# Patient Record
Sex: FEMALE | Race: WHITE | Employment: OTHER | URBAN - METROPOLITAN AREA
[De-identification: names, ages, dates, MRNs, and addresses within clinical notes are randomized per-mention and may not be internally consistent; named-entity substitution may affect disease eponyms.]

---

## 2018-08-13 ENCOUNTER — APPOINTMENT (OUTPATIENT)
Dept: GENERAL RADIOLOGY | Age: 78
DRG: 470 | End: 2018-08-13
Payer: MEDICARE

## 2018-08-13 ENCOUNTER — HOSPITAL ENCOUNTER (INPATIENT)
Age: 78
LOS: 8 days | Discharge: NURSING, REHAB OR LONG-TERM CARE | DRG: 470 | End: 2018-08-21
Attending: EMERGENCY MEDICINE | Admitting: INTERNAL MEDICINE
Payer: MEDICARE

## 2018-08-13 DIAGNOSIS — S72.002A CLOSED FRACTURE OF LEFT HIP, INITIAL ENCOUNTER (HCC): Primary | ICD-10-CM

## 2018-08-13 PROBLEM — M25.552 HIP PAIN, ACUTE, LEFT: Status: ACTIVE | Noted: 2018-08-13

## 2018-08-13 LAB
ABO/RH: NORMAL
ALBUMIN SERPL-MCNC: 4.4 G/DL (ref 3.4–5)
ANION GAP SERPL CALCULATED.3IONS-SCNC: 10 MMOL/L (ref 3–16)
ANTIBODY SCREEN: NORMAL
APTT: 30 SEC (ref 26–36)
BASOPHILS ABSOLUTE: 0.1 K/UL (ref 0–0.2)
BASOPHILS RELATIVE PERCENT: 0.9 %
BUN BLDV-MCNC: 15 MG/DL (ref 7–20)
CALCIUM SERPL-MCNC: 9.5 MG/DL (ref 8.3–10.6)
CHLORIDE BLD-SCNC: 95 MMOL/L (ref 99–110)
CO2: 29 MMOL/L (ref 21–32)
CREAT SERPL-MCNC: 0.7 MG/DL (ref 0.6–1.2)
EKG ATRIAL RATE: 89 BPM
EKG DIAGNOSIS: NORMAL
EKG P AXIS: 66 DEGREES
EKG P-R INTERVAL: 194 MS
EKG Q-T INTERVAL: 348 MS
EKG QRS DURATION: 80 MS
EKG QTC CALCULATION (BAZETT): 423 MS
EKG R AXIS: -17 DEGREES
EKG T AXIS: 67 DEGREES
EKG VENTRICULAR RATE: 89 BPM
EOSINOPHILS ABSOLUTE: 0.4 K/UL (ref 0–0.6)
EOSINOPHILS RELATIVE PERCENT: 6.4 %
GFR AFRICAN AMERICAN: >60
GFR NON-AFRICAN AMERICAN: >60
GLUCOSE BLD-MCNC: 112 MG/DL (ref 70–99)
HCT VFR BLD CALC: 31.5 % (ref 36–48)
HEMOGLOBIN: 10.6 G/DL (ref 12–16)
INR BLD: 0.96 (ref 0.86–1.14)
LYMPHOCYTES ABSOLUTE: 1 K/UL (ref 1–5.1)
LYMPHOCYTES RELATIVE PERCENT: 16.2 %
MCH RBC QN AUTO: 31.1 PG (ref 26–34)
MCHC RBC AUTO-ENTMCNC: 33.6 G/DL (ref 31–36)
MCV RBC AUTO: 92.6 FL (ref 80–100)
MONOCYTES ABSOLUTE: 0.5 K/UL (ref 0–1.3)
MONOCYTES RELATIVE PERCENT: 7.2 %
NEUTROPHILS ABSOLUTE: 4.4 K/UL (ref 1.7–7.7)
NEUTROPHILS RELATIVE PERCENT: 69.3 %
PDW BLD-RTO: 13.2 % (ref 12.4–15.4)
PHOSPHORUS: 3.2 MG/DL (ref 2.5–4.9)
PLATELET # BLD: 242 K/UL (ref 135–450)
PMV BLD AUTO: 7 FL (ref 5–10.5)
POTASSIUM SERPL-SCNC: 4.3 MMOL/L (ref 3.5–5.1)
PROTHROMBIN TIME: 11 SEC (ref 9.8–13)
RBC # BLD: 3.41 M/UL (ref 4–5.2)
SODIUM BLD-SCNC: 134 MMOL/L (ref 136–145)
WBC # BLD: 6.4 K/UL (ref 4–11)

## 2018-08-13 PROCEDURE — 85730 THROMBOPLASTIN TIME PARTIAL: CPT

## 2018-08-13 PROCEDURE — 6360000002 HC RX W HCPCS

## 2018-08-13 PROCEDURE — 2580000003 HC RX 258: Performed by: INTERNAL MEDICINE

## 2018-08-13 PROCEDURE — 86850 RBC ANTIBODY SCREEN: CPT

## 2018-08-13 PROCEDURE — 73552 X-RAY EXAM OF FEMUR 2/>: CPT

## 2018-08-13 PROCEDURE — 85610 PROTHROMBIN TIME: CPT

## 2018-08-13 PROCEDURE — 96375 TX/PRO/DX INJ NEW DRUG ADDON: CPT

## 2018-08-13 PROCEDURE — 96376 TX/PRO/DX INJ SAME DRUG ADON: CPT

## 2018-08-13 PROCEDURE — 72170 X-RAY EXAM OF PELVIS: CPT

## 2018-08-13 PROCEDURE — 6370000000 HC RX 637 (ALT 250 FOR IP): Performed by: INTERNAL MEDICINE

## 2018-08-13 PROCEDURE — 85025 COMPLETE CBC W/AUTO DIFF WBC: CPT

## 2018-08-13 PROCEDURE — 6360000002 HC RX W HCPCS: Performed by: INTERNAL MEDICINE

## 2018-08-13 PROCEDURE — 73562 X-RAY EXAM OF KNEE 3: CPT

## 2018-08-13 PROCEDURE — 2500000003 HC RX 250 WO HCPCS

## 2018-08-13 PROCEDURE — 86901 BLOOD TYPING SEROLOGIC RH(D): CPT

## 2018-08-13 PROCEDURE — 1200000000 HC SEMI PRIVATE

## 2018-08-13 PROCEDURE — 96374 THER/PROPH/DIAG INJ IV PUSH: CPT

## 2018-08-13 PROCEDURE — 51702 INSERT TEMP BLADDER CATH: CPT

## 2018-08-13 PROCEDURE — 6360000002 HC RX W HCPCS: Performed by: EMERGENCY MEDICINE

## 2018-08-13 PROCEDURE — 86900 BLOOD TYPING SEROLOGIC ABO: CPT

## 2018-08-13 PROCEDURE — 93005 ELECTROCARDIOGRAM TRACING: CPT | Performed by: EMERGENCY MEDICINE

## 2018-08-13 PROCEDURE — 99285 EMERGENCY DEPT VISIT HI MDM: CPT

## 2018-08-13 PROCEDURE — 80069 RENAL FUNCTION PANEL: CPT

## 2018-08-13 PROCEDURE — 71045 X-RAY EXAM CHEST 1 VIEW: CPT

## 2018-08-13 RX ORDER — LISINOPRIL 20 MG/1
20 TABLET ORAL DAILY
Status: DISCONTINUED | OUTPATIENT
Start: 2018-08-14 | End: 2018-08-14

## 2018-08-13 RX ORDER — PROMETHAZINE HYDROCHLORIDE 25 MG/ML
25 INJECTION, SOLUTION INTRAMUSCULAR; INTRAVENOUS EVERY 4 HOURS PRN
Status: DISCONTINUED | OUTPATIENT
Start: 2018-08-13 | End: 2018-08-15

## 2018-08-13 RX ORDER — FENTANYL CITRATE 50 UG/ML
INJECTION, SOLUTION INTRAMUSCULAR; INTRAVENOUS
Status: COMPLETED
Start: 2018-08-13 | End: 2018-08-13

## 2018-08-13 RX ORDER — LEVOTHYROXINE SODIUM 0.1 MG/1
100 TABLET ORAL DAILY
COMMUNITY

## 2018-08-13 RX ORDER — OXYCODONE HYDROCHLORIDE AND ACETAMINOPHEN 5; 325 MG/1; MG/1
2 TABLET ORAL EVERY 4 HOURS PRN
Status: DISCONTINUED | OUTPATIENT
Start: 2018-08-13 | End: 2018-08-21 | Stop reason: HOSPADM

## 2018-08-13 RX ORDER — ONDANSETRON 2 MG/ML
INJECTION INTRAMUSCULAR; INTRAVENOUS
Status: COMPLETED
Start: 2018-08-13 | End: 2018-08-13

## 2018-08-13 RX ORDER — CARBAMAZEPINE 100 MG/1
100 TABLET, EXTENDED RELEASE ORAL 2 TIMES DAILY
Status: DISCONTINUED | OUTPATIENT
Start: 2018-08-13 | End: 2018-08-21 | Stop reason: HOSPADM

## 2018-08-13 RX ORDER — FENTANYL CITRATE 50 UG/ML
50 INJECTION, SOLUTION INTRAMUSCULAR; INTRAVENOUS ONCE
Status: COMPLETED | OUTPATIENT
Start: 2018-08-13 | End: 2018-08-13

## 2018-08-13 RX ORDER — GABAPENTIN 600 MG/1
1200 TABLET ORAL 3 TIMES DAILY
COMMUNITY

## 2018-08-13 RX ORDER — OXYCODONE HYDROCHLORIDE AND ACETAMINOPHEN 5; 325 MG/1; MG/1
1 TABLET ORAL EVERY 4 HOURS PRN
Status: DISCONTINUED | OUTPATIENT
Start: 2018-08-13 | End: 2018-08-21 | Stop reason: HOSPADM

## 2018-08-13 RX ORDER — CETIRIZINE HYDROCHLORIDE 10 MG/1
10 TABLET ORAL DAILY
Status: DISCONTINUED | OUTPATIENT
Start: 2018-08-14 | End: 2018-08-21 | Stop reason: HOSPADM

## 2018-08-13 RX ORDER — SODIUM CHLORIDE 9 MG/ML
INJECTION, SOLUTION INTRAVENOUS CONTINUOUS
Status: DISCONTINUED | OUTPATIENT
Start: 2018-08-13 | End: 2018-08-18

## 2018-08-13 RX ORDER — LISINOPRIL 20 MG/1
20 TABLET ORAL DAILY
COMMUNITY

## 2018-08-13 RX ORDER — HYDROCHLOROTHIAZIDE 25 MG/1
25 TABLET ORAL DAILY
Status: ON HOLD | COMMUNITY
End: 2018-08-16 | Stop reason: HOSPADM

## 2018-08-13 RX ORDER — CARBAMAZEPINE 100 MG/1
100 TABLET, EXTENDED RELEASE ORAL 2 TIMES DAILY
COMMUNITY

## 2018-08-13 RX ORDER — SODIUM CHLORIDE 0.9 % (FLUSH) 0.9 %
10 SYRINGE (ML) INJECTION EVERY 12 HOURS SCHEDULED
Status: DISCONTINUED | OUTPATIENT
Start: 2018-08-13 | End: 2018-08-21 | Stop reason: HOSPADM

## 2018-08-13 RX ORDER — HYDROCHLOROTHIAZIDE 25 MG/1
25 TABLET ORAL DAILY
Status: DISCONTINUED | OUTPATIENT
Start: 2018-08-14 | End: 2018-08-14

## 2018-08-13 RX ORDER — MORPHINE SULFATE 2 MG/ML
2 INJECTION, SOLUTION INTRAMUSCULAR; INTRAVENOUS EVERY 4 HOURS PRN
Status: DISCONTINUED | OUTPATIENT
Start: 2018-08-13 | End: 2018-08-21 | Stop reason: HOSPADM

## 2018-08-13 RX ORDER — CARBAMAZEPINE 100 MG/1
100 TABLET, CHEWABLE ORAL 3 TIMES DAILY
Status: DISCONTINUED | OUTPATIENT
Start: 2018-08-13 | End: 2018-08-13 | Stop reason: ALTCHOICE

## 2018-08-13 RX ORDER — GABAPENTIN 600 MG/1
1200 TABLET ORAL 3 TIMES DAILY
Status: DISCONTINUED | OUTPATIENT
Start: 2018-08-13 | End: 2018-08-21 | Stop reason: HOSPADM

## 2018-08-13 RX ORDER — GABAPENTIN 300 MG/1
600 CAPSULE ORAL 3 TIMES DAILY
Status: DISCONTINUED | OUTPATIENT
Start: 2018-08-13 | End: 2018-08-13 | Stop reason: ALTCHOICE

## 2018-08-13 RX ORDER — DOCUSATE SODIUM 100 MG/1
100 CAPSULE, LIQUID FILLED ORAL 2 TIMES DAILY PRN
Status: DISCONTINUED | OUTPATIENT
Start: 2018-08-13 | End: 2018-08-15

## 2018-08-13 RX ORDER — ONDANSETRON 2 MG/ML
4 INJECTION INTRAMUSCULAR; INTRAVENOUS ONCE
Status: COMPLETED | OUTPATIENT
Start: 2018-08-13 | End: 2018-08-13

## 2018-08-13 RX ORDER — FAMOTIDINE 20 MG/1
20 TABLET, FILM COATED ORAL 2 TIMES DAILY
Status: DISCONTINUED | OUTPATIENT
Start: 2018-08-13 | End: 2018-08-17

## 2018-08-13 RX ORDER — FLUTICASONE PROPIONATE 50 MCG
2 SPRAY, SUSPENSION (ML) NASAL DAILY
Status: DISCONTINUED | OUTPATIENT
Start: 2018-08-14 | End: 2018-08-21 | Stop reason: HOSPADM

## 2018-08-13 RX ORDER — BISACODYL 10 MG
10 SUPPOSITORY, RECTAL RECTAL DAILY PRN
Status: DISCONTINUED | OUTPATIENT
Start: 2018-08-13 | End: 2018-08-15

## 2018-08-13 RX ORDER — NICOTINE 21 MG/24HR
1 PATCH, TRANSDERMAL 24 HOURS TRANSDERMAL DAILY PRN
Status: DISCONTINUED | OUTPATIENT
Start: 2018-08-13 | End: 2018-08-21 | Stop reason: HOSPADM

## 2018-08-13 RX ORDER — SODIUM CHLORIDE 0.9 % (FLUSH) 0.9 %
10 SYRINGE (ML) INJECTION PRN
Status: DISCONTINUED | OUTPATIENT
Start: 2018-08-13 | End: 2018-08-21 | Stop reason: HOSPADM

## 2018-08-13 RX ORDER — LORAZEPAM 0.5 MG/1
0.5 TABLET ORAL NIGHTLY PRN
Status: DISCONTINUED | OUTPATIENT
Start: 2018-08-13 | End: 2018-08-21 | Stop reason: HOSPADM

## 2018-08-13 RX ORDER — LEVOTHYROXINE SODIUM 0.1 MG/1
200 TABLET ORAL DAILY
Status: DISCONTINUED | OUTPATIENT
Start: 2018-08-13 | End: 2018-08-13 | Stop reason: ALTCHOICE

## 2018-08-13 RX ORDER — ONDANSETRON 2 MG/ML
4 INJECTION INTRAMUSCULAR; INTRAVENOUS EVERY 4 HOURS PRN
Status: DISCONTINUED | OUTPATIENT
Start: 2018-08-13 | End: 2018-08-21 | Stop reason: HOSPADM

## 2018-08-13 RX ORDER — LEVOTHYROXINE SODIUM 0.1 MG/1
100 TABLET ORAL DAILY
Status: DISCONTINUED | OUTPATIENT
Start: 2018-08-14 | End: 2018-08-21 | Stop reason: HOSPADM

## 2018-08-13 RX ADMIN — FENTANYL CITRATE 50 MCG: 50 INJECTION INTRAMUSCULAR; INTRAVENOUS at 15:06

## 2018-08-13 RX ADMIN — OXYCODONE HYDROCHLORIDE AND ACETAMINOPHEN 1 TABLET: 5; 325 TABLET ORAL at 16:58

## 2018-08-13 RX ADMIN — MORPHINE SULFATE 2 MG: 2 INJECTION, SOLUTION INTRAMUSCULAR; INTRAVENOUS at 18:18

## 2018-08-13 RX ADMIN — CARBAMAZEPINE 100 MG: 100 TABLET, EXTENDED RELEASE ORAL at 23:18

## 2018-08-13 RX ADMIN — ONDANSETRON HYDROCHLORIDE 4 MG: 2 INJECTION, SOLUTION INTRAMUSCULAR; INTRAVENOUS at 18:04

## 2018-08-13 RX ADMIN — FENTANYL CITRATE 50 MCG: 50 INJECTION, SOLUTION INTRAMUSCULAR; INTRAVENOUS at 13:53

## 2018-08-13 RX ADMIN — ONDANSETRON 4 MG: 2 INJECTION INTRAMUSCULAR; INTRAVENOUS at 13:53

## 2018-08-13 RX ADMIN — SODIUM CHLORIDE: 9 INJECTION, SOLUTION INTRAVENOUS at 18:45

## 2018-08-13 RX ADMIN — FAMOTIDINE 20 MG: 20 TABLET ORAL at 23:18

## 2018-08-13 RX ADMIN — GABAPENTIN 1200 MG: 600 TABLET, FILM COATED ORAL at 23:18

## 2018-08-13 ASSESSMENT — PAIN DESCRIPTION - LOCATION
LOCATION: KNEE
LOCATION: KNEE

## 2018-08-13 ASSESSMENT — PAIN DESCRIPTION - FREQUENCY: FREQUENCY: CONTINUOUS

## 2018-08-13 ASSESSMENT — PAIN SCALES - GENERAL
PAINLEVEL_OUTOF10: 8
PAINLEVEL_OUTOF10: 10
PAINLEVEL_OUTOF10: 9
PAINLEVEL_OUTOF10: 10
PAINLEVEL_OUTOF10: 8

## 2018-08-13 ASSESSMENT — PAIN DESCRIPTION - ORIENTATION
ORIENTATION: LEFT
ORIENTATION: LEFT

## 2018-08-13 ASSESSMENT — PAIN DESCRIPTION - PAIN TYPE: TYPE: ACUTE PAIN

## 2018-08-13 ASSESSMENT — PAIN DESCRIPTION - PROGRESSION: CLINICAL_PROGRESSION: GRADUALLY WORSENING

## 2018-08-13 ASSESSMENT — PAIN DESCRIPTION - ONSET: ONSET: ON-GOING

## 2018-08-13 ASSESSMENT — PAIN DESCRIPTION - DESCRIPTORS: DESCRIPTORS: ACHING

## 2018-08-13 NOTE — ED PROVIDER NOTES
levothyroxine (SYNTHROID) tablet 200 mcg    DISCONTD: gabapentin (NEURONTIN) capsule 600 mg    fluticasone (FLONASE) 50 MCG/ACT nasal spray 2 spray    DISCONTD: carBAMazepine (TEGRETOL) chewable tablet 100 mg    morphine injection 2 mg    OR Linked Order Group     oxyCODONE-acetaminophen (PERCOCET) 5-325 MG per tablet 1 tablet     oxyCODONE-acetaminophen (PERCOCET) 5-325 MG per tablet 2 tablet    sodium chloride flush 0.9 % injection 10 mL    sodium chloride flush 0.9 % injection 10 mL    magnesium hydroxide (MILK OF MAGNESIA) 400 MG/5ML suspension 30 mL    docusate sodium (COLACE) capsule 100 mg    bisacodyl (DULCOLAX) suppository 10 mg    ondansetron (ZOFRAN) injection 4 mg    famotidine (PEPCID) tablet 20 mg    nicotine (NICODERM CQ) 21 MG/24HR 1 patch    enoxaparin (LOVENOX) injection 40 mg    0.9 % sodium chloride infusion    promethazine (PHENERGAN) injection 25 mg    lisinopril (PRINIVIL;ZESTRIL) tablet 20 mg    levothyroxine (SYNTHROID) tablet 100 mcg    hydrochlorothiazide (HYDRODIURIL) tablet 25 mg    gabapentin (NEURONTIN) tablet 1,200 mg    carBAMazepine (TEGRETOL XR) extended release tablet 100 mg       CONSULTS:  IP CONSULT TO ORTHOPEDIC SURGERY  IP CONSULT TO HOSPITALIST    MEDICAL DECISION MAKING     Chaparro Crawford is a 69 y.o. female who presents with Left knee pain and found to have left femoral neck fx. I spoke with the orthopedist on calll and with the hospitalist for admission. Clinical Impression     1. Closed fracture of left hip, initial encounter [facility])        Disposition/Plan     PATIENT REFERRED TO:  Renato Hobbs MD  [address]  [phone]            DISCHARGE MEDICATIONS:  Current Discharge Medication List          DISPOSITION  Admitted to the hospitalist      (Please note that portions of this note were completed with voice recognition software.   Efforts were made to edit the dictations but occasionally

## 2018-08-13 NOTE — PROGRESS NOTES
4 Eyes Admission Assessment     I agree as the admission nurse that 2 RN's have performed a thorough Head to Toe Skin Assessment on the patient. ALL assessment sites listed below have been assessed on admission. Areas assessed by both nurses:   [x]   Head, Face, and Ears   [x]   Shoulders, Back, and Chest  [x]   Arms, Elbows, and Hands   [x]   Coccyx, Sacrum, and Ischum  [x]   Legs, Feet, and Heels        Does the Patient have Skin Breakdown? No        Eugene Prevention initiated:      Wound Care Orders initiated:        Jayson Calabrese consulted for Pressure Injury (Stage 3,4, Unstageable, DTI, NWPT, and Complex wounds):       Nurse 1 eSignature: Electronically signed by Manny Petersen RN on 8/13/18 at 6:34 PM    **SHARE this note so that the co-signing nurse is able to place an eSignature**    Nurse 2 eSignature: Electronically signed by Bina Rivera RN on 8/13/18 at 6:36 PM

## 2018-08-13 NOTE — CONSULTS
[address]   1940  [id number]      CONSULTING PHYSICIAN: Marly Thurston DO for Merlinda Quarto, MD  DATE OF CONSULTATION: 8/13/2018   CHIEF COMPLAINT:        Asked to see patient for Left Femoral Neck fracture        HISTORY OF PRESENT ILLNESS:        The patient is a 69 y.o. female who was admitted after falling earlier today at the Vet's office where she had taken her dog for shots. She was spun around by the dog and fell to her Left hip. She was unable to stand after the fall. She reports no other significant injuries. She denies prior hip pain. She does occasionally use a cane or a walker and has a history of a Left Knee replacement. She is seen in [facility] ED and reports moderate Left hip pain. XR shows displaced Left femoral neck fracture. No CP/SOB/F/C. PAST MEDICAL HISTORY:        Diagnosis Date    Arthritis     oa    Closed fracture of neck of left femur ([address] 8/13/2018    Degenerative disc disease     Hypertension     Thyroid disease     Trigeminal nerve disease or syndrome      PAST SURGICAL HISTORY:        Procedure Laterality Date    BACK SURGERY      JOINT REPLACEMENT      right hand joint s in fingers , right knee    LAMINECTOMY  1/3/11    L4-5 decompressive laminectomy    OTHER SURGICAL HISTORY      slow to awaken from anesthesia    SHOULDER SURGERY      right rotator cuff repair    SINUS SURGERY      TONSILLECTOMY       CURRENT MEDICATIONS:     oxyCODONE-acetaminophen (PERCOCET) 5-325 MG per tablet 1 tablet Q4H PRN   Or    oxyCODONE-acetaminophen (PERCOCET) 5-325 MG per tablet 2 tablet Q4H PRN   ondansetron (ZOFRAN) injection 4 mg Q4H PRN   promethazine (PHENERGAN) injection 25 mg Q4H PRN     ALLERGIES:    Doxycycline; Morphine; Vicodin [hydrocodone-acetaminophen]; and Morphine sulfate  SOCIAL HISTORY:   TOBACCO:   reports that she has never smoked. She does not have any smokeless tobacco history on file.   ETOH:   reports that she

## 2018-08-14 ENCOUNTER — ANESTHESIA EVENT (OUTPATIENT)
Dept: OPERATING ROOM | Age: 78
DRG: 470 | End: 2018-08-14
Payer: MEDICARE

## 2018-08-14 ENCOUNTER — ANESTHESIA (OUTPATIENT)
Dept: OPERATING ROOM | Age: 78
DRG: 470 | End: 2018-08-14
Payer: MEDICARE

## 2018-08-14 ENCOUNTER — APPOINTMENT (OUTPATIENT)
Dept: GENERAL RADIOLOGY | Age: 78
DRG: 470 | End: 2018-08-14
Payer: MEDICARE

## 2018-08-14 VITALS — OXYGEN SATURATION: 100 % | DIASTOLIC BLOOD PRESSURE: 63 MMHG | SYSTOLIC BLOOD PRESSURE: 140 MMHG | TEMPERATURE: 98.4 F

## 2018-08-14 LAB
ANION GAP SERPL CALCULATED.3IONS-SCNC: 8 MMOL/L (ref 3–16)
BASOPHILS ABSOLUTE: 0.1 K/UL (ref 0–0.2)
BASOPHILS RELATIVE PERCENT: 0.7 %
BLOOD BANK DISPENSE STATUS: NORMAL
BLOOD BANK PRODUCT CODE: NORMAL
BPU ID: NORMAL
BUN BLDV-MCNC: 13 MG/DL (ref 7–20)
CALCIUM SERPL-MCNC: 8.7 MG/DL (ref 8.3–10.6)
CHLORIDE BLD-SCNC: 97 MMOL/L (ref 99–110)
CO2: 28 MMOL/L (ref 21–32)
CREAT SERPL-MCNC: 0.8 MG/DL (ref 0.6–1.2)
DESCRIPTION BLOOD BANK: NORMAL
EOSINOPHILS ABSOLUTE: 0.7 K/UL (ref 0–0.6)
EOSINOPHILS RELATIVE PERCENT: 8.8 %
GFR AFRICAN AMERICAN: >60
GFR NON-AFRICAN AMERICAN: >60
GLUCOSE BLD-MCNC: 90 MG/DL (ref 70–99)
HCT VFR BLD CALC: 26.4 % (ref 36–48)
HEMOGLOBIN: 9.1 G/DL (ref 12–16)
LYMPHOCYTES ABSOLUTE: 1.4 K/UL (ref 1–5.1)
LYMPHOCYTES RELATIVE PERCENT: 17.2 %
MCH RBC QN AUTO: 31.8 PG (ref 26–34)
MCHC RBC AUTO-ENTMCNC: 34.4 G/DL (ref 31–36)
MCV RBC AUTO: 92.3 FL (ref 80–100)
MONOCYTES ABSOLUTE: 0.8 K/UL (ref 0–1.3)
MONOCYTES RELATIVE PERCENT: 9.8 %
NEUTROPHILS ABSOLUTE: 5.3 K/UL (ref 1.7–7.7)
NEUTROPHILS RELATIVE PERCENT: 63.5 %
PDW BLD-RTO: 13.2 % (ref 12.4–15.4)
PLATELET # BLD: 189 K/UL (ref 135–450)
PMV BLD AUTO: 6.6 FL (ref 5–10.5)
POTASSIUM REFLEX MAGNESIUM: 4.2 MMOL/L (ref 3.5–5.1)
RBC # BLD: 2.85 M/UL (ref 4–5.2)
SODIUM BLD-SCNC: 133 MMOL/L (ref 136–145)
WBC # BLD: 8.4 K/UL (ref 4–11)

## 2018-08-14 PROCEDURE — 7100000001 HC PACU RECOVERY - ADDTL 15 MIN: Performed by: ORTHOPAEDIC SURGERY

## 2018-08-14 PROCEDURE — 2580000003 HC RX 258: Performed by: ORTHOPAEDIC SURGERY

## 2018-08-14 PROCEDURE — 36415 COLL VENOUS BLD VENIPUNCTURE: CPT

## 2018-08-14 PROCEDURE — C1713 ANCHOR/SCREW BN/BN,TIS/BN: HCPCS | Performed by: ORTHOPAEDIC SURGERY

## 2018-08-14 PROCEDURE — 85018 HEMOGLOBIN: CPT

## 2018-08-14 PROCEDURE — 3700000000 HC ANESTHESIA ATTENDED CARE: Performed by: ORTHOPAEDIC SURGERY

## 2018-08-14 PROCEDURE — 6360000002 HC RX W HCPCS: Performed by: INTERNAL MEDICINE

## 2018-08-14 PROCEDURE — 1200000000 HC SEMI PRIVATE

## 2018-08-14 PROCEDURE — 3700000001 HC ADD 15 MINUTES (ANESTHESIA): Performed by: ORTHOPAEDIC SURGERY

## 2018-08-14 PROCEDURE — 2580000003 HC RX 258: Performed by: NURSE ANESTHETIST, CERTIFIED REGISTERED

## 2018-08-14 PROCEDURE — 80048 BASIC METABOLIC PNL TOTAL CA: CPT

## 2018-08-14 PROCEDURE — C1776 JOINT DEVICE (IMPLANTABLE): HCPCS | Performed by: ORTHOPAEDIC SURGERY

## 2018-08-14 PROCEDURE — P9016 RBC LEUKOCYTES REDUCED: HCPCS

## 2018-08-14 PROCEDURE — 0SRS0J9 REPLACEMENT OF LEFT HIP JOINT, FEMORAL SURFACE WITH SYNTHETIC SUBSTITUTE, CEMENTED, OPEN APPROACH: ICD-10-PCS | Performed by: ORTHOPAEDIC SURGERY

## 2018-08-14 PROCEDURE — 2720000010 HC SURG SUPPLY STERILE: Performed by: ORTHOPAEDIC SURGERY

## 2018-08-14 PROCEDURE — 72170 X-RAY EXAM OF PELVIS: CPT

## 2018-08-14 PROCEDURE — 3600000004 HC SURGERY LEVEL 4 BASE: Performed by: ORTHOPAEDIC SURGERY

## 2018-08-14 PROCEDURE — 3600000014 HC SURGERY LEVEL 4 ADDTL 15MIN: Performed by: ORTHOPAEDIC SURGERY

## 2018-08-14 PROCEDURE — 6360000002 HC RX W HCPCS: Performed by: NURSE PRACTITIONER

## 2018-08-14 PROCEDURE — 6360000002 HC RX W HCPCS: Performed by: ORTHOPAEDIC SURGERY

## 2018-08-14 PROCEDURE — 2580000003 HC RX 258: Performed by: INTERNAL MEDICINE

## 2018-08-14 PROCEDURE — 2709999900 HC NON-CHARGEABLE SUPPLY: Performed by: ORTHOPAEDIC SURGERY

## 2018-08-14 PROCEDURE — 6370000000 HC RX 637 (ALT 250 FOR IP): Performed by: INTERNAL MEDICINE

## 2018-08-14 PROCEDURE — 7100000000 HC PACU RECOVERY - FIRST 15 MIN: Performed by: ORTHOPAEDIC SURGERY

## 2018-08-14 PROCEDURE — 6360000002 HC RX W HCPCS: Performed by: NURSE ANESTHETIST, CERTIFIED REGISTERED

## 2018-08-14 PROCEDURE — 6360000002 HC RX W HCPCS: Performed by: ANESTHESIOLOGY

## 2018-08-14 PROCEDURE — 85025 COMPLETE CBC W/AUTO DIFF WBC: CPT

## 2018-08-14 PROCEDURE — 2500000003 HC RX 250 WO HCPCS: Performed by: NURSE ANESTHETIST, CERTIFIED REGISTERED

## 2018-08-14 PROCEDURE — 86923 COMPATIBILITY TEST ELECTRIC: CPT

## 2018-08-14 DEVICE — STEM FEM SZ 3 L131MM NK L35MM +43MM OFFSET 127DEG STD HIP: Type: IMPLANTABLE DEVICE | Site: HIP | Status: FUNCTIONAL

## 2018-08-14 DEVICE — IMPL HIP FEM HEAD TAPR VITALLIUM 4MM: Type: IMPLANTABLE DEVICE | Site: HIP | Status: FUNCTIONAL

## 2018-08-14 DEVICE — SET CBL SL SM DIA2MM VIT FOR RECON AND TRAUM SYS DALL-M: Type: IMPLANTABLE DEVICE | Site: HIP | Status: FUNCTIONAL

## 2018-08-14 DEVICE — CEMENT BNE 20ML 40GM FULL DOSE PMMA W/O ANTIBIO M VISC: Type: IMPLANTABLE DEVICE | Site: HIP | Status: FUNCTIONAL

## 2018-08-14 DEVICE — IMPLANTABLE DEVICE: Type: IMPLANTABLE DEVICE | Site: HIP | Status: FUNCTIONAL

## 2018-08-14 DEVICE — KIT BNE CEM PREP PLUG BRSH CURET SPNG W/ RESTRIC FOR FEM: Type: IMPLANTABLE DEVICE | Site: HIP | Status: FUNCTIONAL

## 2018-08-14 DEVICE — COMPONENT TOT HIP CAPPED STD FRAC: Type: IMPLANTABLE DEVICE | Site: HIP | Status: FUNCTIONAL

## 2018-08-14 RX ORDER — SODIUM CHLORIDE 0.9 % (FLUSH) 0.9 %
10 SYRINGE (ML) INJECTION PRN
Status: DISCONTINUED | OUTPATIENT
Start: 2018-08-14 | End: 2018-08-21 | Stop reason: HOSPADM

## 2018-08-14 RX ORDER — PROMETHAZINE HYDROCHLORIDE 25 MG/ML
6.25 INJECTION, SOLUTION INTRAMUSCULAR; INTRAVENOUS
Status: DISCONTINUED | OUTPATIENT
Start: 2018-08-14 | End: 2018-08-14 | Stop reason: HOSPADM

## 2018-08-14 RX ORDER — ONDANSETRON 2 MG/ML
4 INJECTION INTRAMUSCULAR; INTRAVENOUS
Status: DISCONTINUED | OUTPATIENT
Start: 2018-08-14 | End: 2018-08-14 | Stop reason: HOSPADM

## 2018-08-14 RX ORDER — ACETAMINOPHEN 325 MG/1
650 TABLET ORAL EVERY 4 HOURS PRN
Status: DISCONTINUED | OUTPATIENT
Start: 2018-08-14 | End: 2018-08-16

## 2018-08-14 RX ORDER — DEXAMETHASONE SODIUM PHOSPHATE 4 MG/ML
INJECTION, SOLUTION INTRA-ARTICULAR; INTRALESIONAL; INTRAMUSCULAR; INTRAVENOUS; SOFT TISSUE PRN
Status: DISCONTINUED | OUTPATIENT
Start: 2018-08-14 | End: 2018-08-14 | Stop reason: SDUPTHER

## 2018-08-14 RX ORDER — DOCUSATE SODIUM 100 MG/1
100 CAPSULE, LIQUID FILLED ORAL 2 TIMES DAILY
Status: DISCONTINUED | OUTPATIENT
Start: 2018-08-14 | End: 2018-08-21 | Stop reason: HOSPADM

## 2018-08-14 RX ORDER — SODIUM CHLORIDE 0.9 % (FLUSH) 0.9 %
10 SYRINGE (ML) INJECTION EVERY 12 HOURS SCHEDULED
Status: DISCONTINUED | OUTPATIENT
Start: 2018-08-14 | End: 2018-08-21 | Stop reason: HOSPADM

## 2018-08-14 RX ORDER — LABETALOL HYDROCHLORIDE 5 MG/ML
5 INJECTION, SOLUTION INTRAVENOUS EVERY 10 MIN PRN
Status: DISCONTINUED | OUTPATIENT
Start: 2018-08-14 | End: 2018-08-14 | Stop reason: HOSPADM

## 2018-08-14 RX ORDER — NOREPINEPHRINE BITARTRATE 1 MG/ML
INJECTION, SOLUTION INTRAVENOUS PRN
Status: DISCONTINUED | OUTPATIENT
Start: 2018-08-14 | End: 2018-08-14

## 2018-08-14 RX ORDER — MAGNESIUM HYDROXIDE 1200 MG/15ML
LIQUID ORAL CONTINUOUS PRN
Status: COMPLETED | OUTPATIENT
Start: 2018-08-14 | End: 2018-08-14

## 2018-08-14 RX ORDER — HYDRALAZINE HYDROCHLORIDE 20 MG/ML
5 INJECTION INTRAMUSCULAR; INTRAVENOUS EVERY 10 MIN PRN
Status: DISCONTINUED | OUTPATIENT
Start: 2018-08-14 | End: 2018-08-14 | Stop reason: HOSPADM

## 2018-08-14 RX ORDER — SODIUM CHLORIDE, SODIUM LACTATE, POTASSIUM CHLORIDE, CALCIUM CHLORIDE 600; 310; 30; 20 MG/100ML; MG/100ML; MG/100ML; MG/100ML
INJECTION, SOLUTION INTRAVENOUS CONTINUOUS PRN
Status: DISCONTINUED | OUTPATIENT
Start: 2018-08-14 | End: 2018-08-14 | Stop reason: SDUPTHER

## 2018-08-14 RX ORDER — FENTANYL CITRATE 50 UG/ML
INJECTION, SOLUTION INTRAMUSCULAR; INTRAVENOUS PRN
Status: DISCONTINUED | OUTPATIENT
Start: 2018-08-14 | End: 2018-08-14 | Stop reason: SDUPTHER

## 2018-08-14 RX ORDER — PROPOFOL 10 MG/ML
INJECTION, EMULSION INTRAVENOUS PRN
Status: DISCONTINUED | OUTPATIENT
Start: 2018-08-14 | End: 2018-08-14 | Stop reason: SDUPTHER

## 2018-08-14 RX ORDER — OXYCODONE HYDROCHLORIDE AND ACETAMINOPHEN 5; 325 MG/1; MG/1
2 TABLET ORAL EVERY 4 HOURS PRN
Status: DISCONTINUED | OUTPATIENT
Start: 2018-08-14 | End: 2018-08-14 | Stop reason: SDUPTHER

## 2018-08-14 RX ORDER — ROCURONIUM BROMIDE 10 MG/ML
INJECTION, SOLUTION INTRAVENOUS PRN
Status: DISCONTINUED | OUTPATIENT
Start: 2018-08-14 | End: 2018-08-14 | Stop reason: SDUPTHER

## 2018-08-14 RX ORDER — EPHEDRINE SULFATE 50 MG/ML
INJECTION INTRAVENOUS PRN
Status: DISCONTINUED | OUTPATIENT
Start: 2018-08-14 | End: 2018-08-14 | Stop reason: SDUPTHER

## 2018-08-14 RX ORDER — SODIUM CHLORIDE 9 MG/ML
INJECTION, SOLUTION INTRAVENOUS CONTINUOUS PRN
Status: DISCONTINUED | OUTPATIENT
Start: 2018-08-14 | End: 2018-08-14 | Stop reason: SDUPTHER

## 2018-08-14 RX ORDER — ONDANSETRON 2 MG/ML
INJECTION INTRAMUSCULAR; INTRAVENOUS PRN
Status: DISCONTINUED | OUTPATIENT
Start: 2018-08-14 | End: 2018-08-14 | Stop reason: SDUPTHER

## 2018-08-14 RX ORDER — OXYCODONE HYDROCHLORIDE AND ACETAMINOPHEN 5; 325 MG/1; MG/1
1 TABLET ORAL EVERY 4 HOURS PRN
Status: DISCONTINUED | OUTPATIENT
Start: 2018-08-14 | End: 2018-08-14 | Stop reason: SDUPTHER

## 2018-08-14 RX ORDER — 0.9 % SODIUM CHLORIDE 0.9 %
250 INTRAVENOUS SOLUTION INTRAVENOUS ONCE
Status: DISCONTINUED | OUTPATIENT
Start: 2018-08-14 | End: 2018-08-21 | Stop reason: HOSPADM

## 2018-08-14 RX ORDER — LIDOCAINE HYDROCHLORIDE 20 MG/ML
INJECTION, SOLUTION EPIDURAL; INFILTRATION; INTRACAUDAL; PERINEURAL PRN
Status: DISCONTINUED | OUTPATIENT
Start: 2018-08-14 | End: 2018-08-14 | Stop reason: SDUPTHER

## 2018-08-14 RX ORDER — HYDRALAZINE HYDROCHLORIDE 20 MG/ML
10 INJECTION INTRAMUSCULAR; INTRAVENOUS EVERY 4 HOURS PRN
Status: DISCONTINUED | OUTPATIENT
Start: 2018-08-14 | End: 2018-08-21 | Stop reason: HOSPADM

## 2018-08-14 RX ORDER — FENTANYL CITRATE 50 UG/ML
50 INJECTION, SOLUTION INTRAMUSCULAR; INTRAVENOUS EVERY 5 MIN PRN
Status: DISCONTINUED | OUTPATIENT
Start: 2018-08-14 | End: 2018-08-14 | Stop reason: HOSPADM

## 2018-08-14 RX ORDER — FENTANYL CITRATE 50 UG/ML
25 INJECTION, SOLUTION INTRAMUSCULAR; INTRAVENOUS EVERY 5 MIN PRN
Status: DISCONTINUED | OUTPATIENT
Start: 2018-08-14 | End: 2018-08-14 | Stop reason: HOSPADM

## 2018-08-14 RX ADMIN — ROCURONIUM BROMIDE 20 MG: 10 INJECTION, SOLUTION INTRAVENOUS at 17:30

## 2018-08-14 RX ADMIN — ROCURONIUM BROMIDE 10 MG: 10 INJECTION, SOLUTION INTRAVENOUS at 19:40

## 2018-08-14 RX ADMIN — ROCURONIUM BROMIDE 20 MG: 10 INJECTION, SOLUTION INTRAVENOUS at 19:22

## 2018-08-14 RX ADMIN — ROCURONIUM BROMIDE 10 MG: 10 INJECTION, SOLUTION INTRAVENOUS at 18:42

## 2018-08-14 RX ADMIN — SODIUM CHLORIDE, SODIUM LACTATE, POTASSIUM CHLORIDE, AND CALCIUM CHLORIDE: 600; 310; 30; 20 INJECTION, SOLUTION INTRAVENOUS at 16:54

## 2018-08-14 RX ADMIN — MORPHINE SULFATE 2 MG: 2 INJECTION, SOLUTION INTRAMUSCULAR; INTRAVENOUS at 01:15

## 2018-08-14 RX ADMIN — EPHEDRINE SULFATE 10 MG: 50 INJECTION INTRAVENOUS at 17:12

## 2018-08-14 RX ADMIN — FENTANYL CITRATE 100 MCG: 50 INJECTION INTRAMUSCULAR; INTRAVENOUS at 16:59

## 2018-08-14 RX ADMIN — ROCURONIUM BROMIDE 30 MG: 10 INJECTION, SOLUTION INTRAVENOUS at 17:53

## 2018-08-14 RX ADMIN — EPHEDRINE SULFATE 10 MG: 50 INJECTION INTRAVENOUS at 19:43

## 2018-08-14 RX ADMIN — FENTANYL CITRATE 50 MCG: 50 INJECTION INTRAMUSCULAR; INTRAVENOUS at 20:31

## 2018-08-14 RX ADMIN — FENTANYL CITRATE 25 MCG: 50 INJECTION INTRAMUSCULAR; INTRAVENOUS at 21:32

## 2018-08-14 RX ADMIN — PHENYLEPHRINE HYDROCHLORIDE 80 MCG: 10 INJECTION, SOLUTION INTRAMUSCULAR; INTRAVENOUS; SUBCUTANEOUS at 18:34

## 2018-08-14 RX ADMIN — ONDANSETRON 4 MG: 2 INJECTION INTRAMUSCULAR; INTRAVENOUS at 20:13

## 2018-08-14 RX ADMIN — MORPHINE SULFATE 2 MG: 2 INJECTION, SOLUTION INTRAMUSCULAR; INTRAVENOUS at 11:44

## 2018-08-14 RX ADMIN — PHENYLEPHRINE HYDROCHLORIDE 80 MCG: 10 INJECTION, SOLUTION INTRAMUSCULAR; INTRAVENOUS; SUBCUTANEOUS at 17:01

## 2018-08-14 RX ADMIN — SODIUM CHLORIDE: 900 INJECTION, SOLUTION INTRAVENOUS at 18:35

## 2018-08-14 RX ADMIN — FENTANYL CITRATE 25 MCG: 50 INJECTION INTRAMUSCULAR; INTRAVENOUS at 21:40

## 2018-08-14 RX ADMIN — SODIUM CHLORIDE, SODIUM LACTATE, POTASSIUM CHLORIDE, AND CALCIUM CHLORIDE: 600; 310; 30; 20 INJECTION, SOLUTION INTRAVENOUS at 18:45

## 2018-08-14 RX ADMIN — SUGAMMADEX 400 MG: 100 INJECTION, SOLUTION INTRAVENOUS at 20:36

## 2018-08-14 RX ADMIN — DEXAMETHASONE SODIUM PHOSPHATE 4 MG: 4 INJECTION, SOLUTION INTRAMUSCULAR; INTRAVENOUS at 17:44

## 2018-08-14 RX ADMIN — PHENYLEPHRINE HYDROCHLORIDE 80 MCG: 10 INJECTION, SOLUTION INTRAMUSCULAR; INTRAVENOUS; SUBCUTANEOUS at 17:05

## 2018-08-14 RX ADMIN — CARBAMAZEPINE 100 MG: 100 TABLET, EXTENDED RELEASE ORAL at 08:38

## 2018-08-14 RX ADMIN — ONDANSETRON HYDROCHLORIDE 4 MG: 2 INJECTION, SOLUTION INTRAMUSCULAR; INTRAVENOUS at 11:44

## 2018-08-14 RX ADMIN — ROCURONIUM BROMIDE 30 MG: 10 INJECTION, SOLUTION INTRAVENOUS at 16:59

## 2018-08-14 RX ADMIN — Medication 2 G: at 23:42

## 2018-08-14 RX ADMIN — PHENYLEPHRINE HYDROCHLORIDE 80 MCG: 10 INJECTION, SOLUTION INTRAMUSCULAR; INTRAVENOUS; SUBCUTANEOUS at 20:13

## 2018-08-14 RX ADMIN — HYDROMORPHONE HYDROCHLORIDE 0.5 MG: 1 INJECTION, SOLUTION INTRAMUSCULAR; INTRAVENOUS; SUBCUTANEOUS at 21:16

## 2018-08-14 RX ADMIN — Medication 2 G: at 17:11

## 2018-08-14 RX ADMIN — OXYCODONE HYDROCHLORIDE AND ACETAMINOPHEN 2 TABLET: 5; 325 TABLET ORAL at 02:37

## 2018-08-14 RX ADMIN — OXYCODONE HYDROCHLORIDE AND ACETAMINOPHEN 2 TABLET: 5; 325 TABLET ORAL at 12:17

## 2018-08-14 RX ADMIN — ROCURONIUM BROMIDE 20 MG: 10 INJECTION, SOLUTION INTRAVENOUS at 18:20

## 2018-08-14 RX ADMIN — LIDOCAINE HYDROCHLORIDE 100 MG: 20 INJECTION, SOLUTION EPIDURAL; INFILTRATION; INTRACAUDAL; PERINEURAL at 16:59

## 2018-08-14 RX ADMIN — PHENYLEPHRINE HYDROCHLORIDE 80 MCG: 10 INJECTION, SOLUTION INTRAMUSCULAR; INTRAVENOUS; SUBCUTANEOUS at 17:38

## 2018-08-14 RX ADMIN — FENTANYL CITRATE 50 MCG: 50 INJECTION INTRAMUSCULAR; INTRAVENOUS at 18:09

## 2018-08-14 RX ADMIN — EPHEDRINE SULFATE 10 MG: 50 INJECTION INTRAVENOUS at 17:18

## 2018-08-14 RX ADMIN — SODIUM CHLORIDE: 9 INJECTION, SOLUTION INTRAVENOUS at 21:32

## 2018-08-14 RX ADMIN — EPHEDRINE SULFATE 10 MG: 50 INJECTION INTRAVENOUS at 20:15

## 2018-08-14 RX ADMIN — EPHEDRINE SULFATE 10 MG: 50 INJECTION INTRAVENOUS at 17:14

## 2018-08-14 RX ADMIN — PROPOFOL 100 MG: 10 INJECTION, EMULSION INTRAVENOUS at 16:59

## 2018-08-14 RX ADMIN — GABAPENTIN 1200 MG: 600 TABLET, FILM COATED ORAL at 08:38

## 2018-08-14 RX ADMIN — ONDANSETRON HYDROCHLORIDE 4 MG: 2 INJECTION, SOLUTION INTRAMUSCULAR; INTRAVENOUS at 01:15

## 2018-08-14 ASSESSMENT — PULMONARY FUNCTION TESTS
PIF_VALUE: 20
PIF_VALUE: 21
PIF_VALUE: 1
PIF_VALUE: 22
PIF_VALUE: 21
PIF_VALUE: 20
PIF_VALUE: 20
PIF_VALUE: 22
PIF_VALUE: 21
PIF_VALUE: 3
PIF_VALUE: 21
PIF_VALUE: 22
PIF_VALUE: 20
PIF_VALUE: 21
PIF_VALUE: 21
PIF_VALUE: 20
PIF_VALUE: 21
PIF_VALUE: 21
PIF_VALUE: 2
PIF_VALUE: 21
PIF_VALUE: 20
PIF_VALUE: 21
PIF_VALUE: 22
PIF_VALUE: 21
PIF_VALUE: 21
PIF_VALUE: 22
PIF_VALUE: 21
PIF_VALUE: 20
PIF_VALUE: 20
PIF_VALUE: 21
PIF_VALUE: 19
PIF_VALUE: 21
PIF_VALUE: 22
PIF_VALUE: 20
PIF_VALUE: 16
PIF_VALUE: 18
PIF_VALUE: 21
PIF_VALUE: 22
PIF_VALUE: 21
PIF_VALUE: 22
PIF_VALUE: 21
PIF_VALUE: 20
PIF_VALUE: 21
PIF_VALUE: 14
PIF_VALUE: 21
PIF_VALUE: 20
PIF_VALUE: 22
PIF_VALUE: 21
PIF_VALUE: 21
PIF_VALUE: 20
PIF_VALUE: 20
PIF_VALUE: 21
PIF_VALUE: 15
PIF_VALUE: 21
PIF_VALUE: 21
PIF_VALUE: 20
PIF_VALUE: 22
PIF_VALUE: 20
PIF_VALUE: 21
PIF_VALUE: 4
PIF_VALUE: 21
PIF_VALUE: 22
PIF_VALUE: 22
PIF_VALUE: 21
PIF_VALUE: 20
PIF_VALUE: 21
PIF_VALUE: 2
PIF_VALUE: 20
PIF_VALUE: 22
PIF_VALUE: 20
PIF_VALUE: 21
PIF_VALUE: 2
PIF_VALUE: 21
PIF_VALUE: 1
PIF_VALUE: 2
PIF_VALUE: 22
PIF_VALUE: 21
PIF_VALUE: 19
PIF_VALUE: 21
PIF_VALUE: 22
PIF_VALUE: 21
PIF_VALUE: 21
PIF_VALUE: 3
PIF_VALUE: 21
PIF_VALUE: 20
PIF_VALUE: 21
PIF_VALUE: 22
PIF_VALUE: 20
PIF_VALUE: 21
PIF_VALUE: 21
PIF_VALUE: 19
PIF_VALUE: 21
PIF_VALUE: 2
PIF_VALUE: 21
PIF_VALUE: 19
PIF_VALUE: 20
PIF_VALUE: 21
PIF_VALUE: 2
PIF_VALUE: 21
PIF_VALUE: 22
PIF_VALUE: 20
PIF_VALUE: 21
PIF_VALUE: 22
PIF_VALUE: 20
PIF_VALUE: 21
PIF_VALUE: 0
PIF_VALUE: 20
PIF_VALUE: 21
PIF_VALUE: 22
PIF_VALUE: 17
PIF_VALUE: 1
PIF_VALUE: 21
PIF_VALUE: 0
PIF_VALUE: 22
PIF_VALUE: 21
PIF_VALUE: 19
PIF_VALUE: 22
PIF_VALUE: 21
PIF_VALUE: 20
PIF_VALUE: 20
PIF_VALUE: 21
PIF_VALUE: 1
PIF_VALUE: 21
PIF_VALUE: 20
PIF_VALUE: 20
PIF_VALUE: 21
PIF_VALUE: 2
PIF_VALUE: 20
PIF_VALUE: 21
PIF_VALUE: 21
PIF_VALUE: 1
PIF_VALUE: 13
PIF_VALUE: 2
PIF_VALUE: 21
PIF_VALUE: 21
PIF_VALUE: 20
PIF_VALUE: 21
PIF_VALUE: 14
PIF_VALUE: 21
PIF_VALUE: 27
PIF_VALUE: 20
PIF_VALUE: 20
PIF_VALUE: 21
PIF_VALUE: 21
PIF_VALUE: 7
PIF_VALUE: 2
PIF_VALUE: 21
PIF_VALUE: 22
PIF_VALUE: 0
PIF_VALUE: 21
PIF_VALUE: 3
PIF_VALUE: 19
PIF_VALUE: 19
PIF_VALUE: 21
PIF_VALUE: 1
PIF_VALUE: 21
PIF_VALUE: 22
PIF_VALUE: 21
PIF_VALUE: 22
PIF_VALUE: 21

## 2018-08-14 ASSESSMENT — PAIN DESCRIPTION - ONSET
ONSET: ON-GOING

## 2018-08-14 ASSESSMENT — PAIN DESCRIPTION - ORIENTATION
ORIENTATION: LEFT

## 2018-08-14 ASSESSMENT — PAIN SCALES - GENERAL
PAINLEVEL_OUTOF10: 5
PAINLEVEL_OUTOF10: 10
PAINLEVEL_OUTOF10: 0
PAINLEVEL_OUTOF10: 10
PAINLEVEL_OUTOF10: 9
PAINLEVEL_OUTOF10: 6
PAINLEVEL_OUTOF10: 8
PAINLEVEL_OUTOF10: 8
PAINLEVEL_OUTOF10: 0
PAINLEVEL_OUTOF10: 5
PAINLEVEL_OUTOF10: 8

## 2018-08-14 ASSESSMENT — PAIN DESCRIPTION - PAIN TYPE
TYPE: ACUTE PAIN

## 2018-08-14 ASSESSMENT — PAIN DESCRIPTION - PROGRESSION
CLINICAL_PROGRESSION: NOT CHANGED

## 2018-08-14 ASSESSMENT — PAIN DESCRIPTION - DESCRIPTORS
DESCRIPTORS: ACHING

## 2018-08-14 ASSESSMENT — PAIN DESCRIPTION - FREQUENCY
FREQUENCY: CONTINUOUS

## 2018-08-14 ASSESSMENT — PAIN DESCRIPTION - LOCATION
LOCATION: HIP

## 2018-08-14 NOTE — PLAN OF CARE
Problem: Falls - Risk of:  Goal: Will remain free from falls  Will remain free from falls   Outcome: Met This Shift  Pt has been free from falls this shift, bed alarm on, bed in lowest position, 2/4 side rails up, nonskid socks on, wheels locked, bedside table and call light in reach. Encouraged pt to call out if needed anything. Problem: Pain:  Goal: Pain level will decrease  Pain level will decrease   Outcome: Ongoing  Pt c/o severe pain down left leg, medicated per mar with prn morphine and percocet. Pt verbalized relief and is now resting in bed. Will continue to assess pain level.

## 2018-08-14 NOTE — PROGRESS NOTES
Ortho Progress Note:    Discussed plan of care with patient, who was aware of fracture and planned surgery. Left hip reji today with Dr. Danae Travis, time TBD. Reviewed surgery with her again. Remain NPO. RN to verify consent. Ancef on call to OR.     Zoila Wei CNP

## 2018-08-14 NOTE — PROGRESS NOTES
HOSPITAL MEDICINE  - PROGRESS NOTE    Admit Date: 8/13/2018         Interval History: 78yof who presented with Left knee pain and was  found to have left femoral neck fx.  -her dog had pulled her down and she fell onto her left side  - no loss consciousness or other injuries. Plan is for OR today    Objective: pain uncontrolled    Diet: Diet NPO Effective Now  Pain is: Moderate  Nausea: Moderate  Bowel Movement/Flatus yes    Data:   Scheduled Meds: Reviewed  Continuous Infusions:   sodium chloride 75 mL/hr at 08/13/18 1845       Intake/Output Summary (Last 24 hours) at 08/14/18 1242  Last data filed at 08/14/18 9774   Gross per 24 hour   Intake                0 ml   Output             1250 ml   Net            -1250 ml     CBC:   Recent Labs      08/14/18   0828   WBC  8.4   HGB  9.1*   PLT  189     BMP:  Recent Labs      08/14/18   0828   NA  133*   K  4.2   CL  97*   CO2  28   BUN  13   CREATININE  0.8   GLUCOSE  90   INR:   Recent Labs      08/13/18   1500   INR  0.96         Objective:   Vitals: BP (!) 173/79   Pulse 91   Temp 99.8 °F (37.7 °C) (Oral)   Resp 18   Ht 5' 2.01\" (1.575 m)   Wt 128 lb (58.1 kg)   SpO2 95%   BMI 23.41 kg/m²   General appearance: alert, appears stated age and cooperative  Skin: Skin color, texture, turgor normal.   HEENT: Head: Normocephalic, no lesions, without obvious abnormality. Neck: supple  Lungs: clear to auscultation bilaterally  Heart: regular rate and rhythm, S1, S2 normal, no murmur, click, rub or gallop  Abdomen: soft, non-tender; bowel sounds normal; no masses,  no organomegaly  Extremities: LLE  Thigh swelling    Neurologic: Mental status: Alert, oriented, thought content appropriate      Assessment & Plan:    Closed fracture of neck of left femur ([address] - secondary to a mechanical fall. OR today. Pain control    Hyponatremia mild. On HCTZ.   Ct periop fluids and recheck in am      Essential  hypertension - hold Lisinopril and HCTZ preop.   monitor blood pressure.     Acquired hypothyroidism -   -on Levothyroxine      dvt prophy    Braulio Lawler MD

## 2018-08-14 NOTE — ANESTHESIA PRE PROCEDURE
RDW 13.2 08/14/2018     08/14/2018       CMP:   Lab Results   Component Value Date     08/14/2018    K 4.2 08/14/2018    CL 97 08/14/2018    CO2 28 08/14/2018    BUN 13 08/14/2018    CREATININE 0.8 08/14/2018    GFRAA >60 08/14/2018    GFRAA >60 01/04/2011    LABGLOM >60 08/14/2018    GLUCOSE 90 08/14/2018    CALCIUM 8.7 08/14/2018       POC Tests: No results for input(s): POCGLU, POCNA, POCK, POCCL, POCBUN, POCHEMO, POCHCT in the last 72 hours. Coags:   Lab Results   Component Value Date    PROTIME 11.0 08/13/2018    INR 0.96 08/13/2018    APTT 30.0 08/13/2018       HCG (If Applicable): No results found for: PREGTESTUR, PREGSERUM, HCG, HCGQUANT     ABGs: No results found for: PHART, PO2ART, URE6QSB, IJC1FJV, BEART, S6JBSUIF     Type & Screen (If Applicable):  No results found for: [facility]    Anesthesia Evaluation  Patient summary reviewed and Nursing notes reviewed  Airway: Mallampati: I  TM distance: >3 FB   Neck ROM: full  Mouth opening: > = 3 FB Dental:    (+) edentulous      Pulmonary:Negative Pulmonary ROS                              Cardiovascular:    (+) hypertension:,         Rhythm: regular  Rate: normal                    Neuro/Psych:               GI/Hepatic/Renal:   (+) GERD:,           Endo/Other:    (+) hypothyroidism::., .                 Abdominal:           Vascular:                                        Anesthesia Plan      general     ASA 2       Induction: intravenous. MIPS: Postoperative opioids intended and Prophylactic antiemetics administered. Anesthetic plan and risks discussed with patient. Plan discussed with CRNA.     Attending anesthesiologist reviewed and agrees with Pre Eval content              Johnathan Dooley DO   8/14/2018

## 2018-08-15 LAB
ALBUMIN SERPL-MCNC: 3 G/DL (ref 3.4–5)
ANION GAP SERPL CALCULATED.3IONS-SCNC: 10 MMOL/L (ref 3–16)
BASOPHILS ABSOLUTE: 0.1 K/UL (ref 0–0.2)
BASOPHILS RELATIVE PERCENT: 1.3 %
BUN BLDV-MCNC: 13 MG/DL (ref 7–20)
CALCIUM SERPL-MCNC: 7.9 MG/DL (ref 8.3–10.6)
CHLORIDE BLD-SCNC: 98 MMOL/L (ref 99–110)
CO2: 23 MMOL/L (ref 21–32)
CREAT SERPL-MCNC: 0.7 MG/DL (ref 0.6–1.2)
EOSINOPHILS ABSOLUTE: 0.1 K/UL (ref 0–0.6)
EOSINOPHILS RELATIVE PERCENT: 0.5 %
GFR AFRICAN AMERICAN: >60
GFR NON-AFRICAN AMERICAN: >60
GLUCOSE BLD-MCNC: 125 MG/DL (ref 70–99)
HCT VFR BLD CALC: 27.1 % (ref 36–48)
HCT VFR BLD CALC: 27.2 % (ref 36–48)
HEMOGLOBIN: 9.2 G/DL (ref 12–16)
HEMOGLOBIN: 9.3 G/DL (ref 12–16)
IRON SATURATION: 11 % (ref 15–50)
IRON: 20 UG/DL (ref 37–145)
LYMPHOCYTES ABSOLUTE: 1.1 K/UL (ref 1–5.1)
LYMPHOCYTES RELATIVE PERCENT: 9.6 %
MAGNESIUM: 1.2 MG/DL (ref 1.8–2.4)
MCH RBC QN AUTO: 31.2 PG (ref 26–34)
MCHC RBC AUTO-ENTMCNC: 33.7 G/DL (ref 31–36)
MCV RBC AUTO: 92.6 FL (ref 80–100)
MONOCYTES ABSOLUTE: 1.1 K/UL (ref 0–1.3)
MONOCYTES RELATIVE PERCENT: 9.5 %
NEUTROPHILS ABSOLUTE: 8.9 K/UL (ref 1.7–7.7)
NEUTROPHILS RELATIVE PERCENT: 79.1 %
PDW BLD-RTO: 14.1 % (ref 12.4–15.4)
PHOSPHORUS: 2.6 MG/DL (ref 2.5–4.9)
PLATELET # BLD: 142 K/UL (ref 135–450)
PMV BLD AUTO: 7.1 FL (ref 5–10.5)
POTASSIUM SERPL-SCNC: 4.7 MMOL/L (ref 3.5–5.1)
RBC # BLD: 2.94 M/UL (ref 4–5.2)
SODIUM BLD-SCNC: 131 MMOL/L (ref 136–145)
TOTAL IRON BINDING CAPACITY: 179 UG/DL (ref 260–445)
WBC # BLD: 11.2 K/UL (ref 4–11)

## 2018-08-15 PROCEDURE — 97530 THERAPEUTIC ACTIVITIES: CPT

## 2018-08-15 PROCEDURE — G8988 SELF CARE GOAL STATUS: HCPCS

## 2018-08-15 PROCEDURE — 85014 HEMATOCRIT: CPT

## 2018-08-15 PROCEDURE — 2580000003 HC RX 258: Performed by: INTERNAL MEDICINE

## 2018-08-15 PROCEDURE — 85025 COMPLETE CBC W/AUTO DIFF WBC: CPT

## 2018-08-15 PROCEDURE — 97166 OT EVAL MOD COMPLEX 45 MIN: CPT

## 2018-08-15 PROCEDURE — 6360000002 HC RX W HCPCS: Performed by: INTERNAL MEDICINE

## 2018-08-15 PROCEDURE — 6370000000 HC RX 637 (ALT 250 FOR IP): Performed by: INTERNAL MEDICINE

## 2018-08-15 PROCEDURE — 6360000002 HC RX W HCPCS: Performed by: ORTHOPAEDIC SURGERY

## 2018-08-15 PROCEDURE — G8987 SELF CARE CURRENT STATUS: HCPCS

## 2018-08-15 PROCEDURE — 85018 HEMOGLOBIN: CPT

## 2018-08-15 PROCEDURE — G8978 MOBILITY CURRENT STATUS: HCPCS

## 2018-08-15 PROCEDURE — G8979 MOBILITY GOAL STATUS: HCPCS

## 2018-08-15 PROCEDURE — 36415 COLL VENOUS BLD VENIPUNCTURE: CPT

## 2018-08-15 PROCEDURE — 83540 ASSAY OF IRON: CPT

## 2018-08-15 PROCEDURE — 2580000003 HC RX 258: Performed by: ORTHOPAEDIC SURGERY

## 2018-08-15 PROCEDURE — 1200000000 HC SEMI PRIVATE

## 2018-08-15 PROCEDURE — 80069 RENAL FUNCTION PANEL: CPT

## 2018-08-15 PROCEDURE — 83550 IRON BINDING TEST: CPT

## 2018-08-15 PROCEDURE — 97162 PT EVAL MOD COMPLEX 30 MIN: CPT

## 2018-08-15 PROCEDURE — 83735 ASSAY OF MAGNESIUM: CPT

## 2018-08-15 RX ORDER — MAGNESIUM SULFATE IN WATER 40 MG/ML
2 INJECTION, SOLUTION INTRAVENOUS ONCE
Status: COMPLETED | OUTPATIENT
Start: 2018-08-15 | End: 2018-08-15

## 2018-08-15 RX ORDER — 0.9 % SODIUM CHLORIDE 0.9 %
500 INTRAVENOUS SOLUTION INTRAVENOUS ONCE
Status: COMPLETED | OUTPATIENT
Start: 2018-08-15 | End: 2018-08-15

## 2018-08-15 RX ADMIN — GABAPENTIN 1200 MG: 600 TABLET, FILM COATED ORAL at 14:12

## 2018-08-15 RX ADMIN — PROMETHAZINE HYDROCHLORIDE 25 MG: 25 INJECTION INTRAMUSCULAR; INTRAVENOUS at 08:31

## 2018-08-15 RX ADMIN — ENOXAPARIN SODIUM 40 MG: 40 INJECTION SUBCUTANEOUS at 10:20

## 2018-08-15 RX ADMIN — OXYCODONE HYDROCHLORIDE AND ACETAMINOPHEN 2 TABLET: 5; 325 TABLET ORAL at 12:16

## 2018-08-15 RX ADMIN — LEVOTHYROXINE SODIUM 100 MCG: 100 TABLET ORAL at 10:22

## 2018-08-15 RX ADMIN — Medication 10 ML: at 10:35

## 2018-08-15 RX ADMIN — ONDANSETRON HYDROCHLORIDE 4 MG: 2 INJECTION, SOLUTION INTRAMUSCULAR; INTRAVENOUS at 06:53

## 2018-08-15 RX ADMIN — OXYCODONE HYDROCHLORIDE AND ACETAMINOPHEN 2 TABLET: 5; 325 TABLET ORAL at 20:46

## 2018-08-15 RX ADMIN — Medication 2 G: at 10:22

## 2018-08-15 RX ADMIN — SODIUM CHLORIDE: 9 INJECTION, SOLUTION INTRAVENOUS at 20:46

## 2018-08-15 RX ADMIN — CETIRIZINE HYDROCHLORIDE 10 MG: 10 TABLET, FILM COATED ORAL at 10:35

## 2018-08-15 RX ADMIN — IRON SUCROSE 200 MG: 20 INJECTION, SOLUTION INTRAVENOUS at 14:12

## 2018-08-15 RX ADMIN — GABAPENTIN 1200 MG: 600 TABLET, FILM COATED ORAL at 10:21

## 2018-08-15 RX ADMIN — ENOXAPARIN SODIUM 40 MG: 40 INJECTION SUBCUTANEOUS at 20:47

## 2018-08-15 RX ADMIN — SODIUM CHLORIDE: 9 INJECTION, SOLUTION INTRAVENOUS at 10:22

## 2018-08-15 RX ADMIN — GABAPENTIN 1200 MG: 600 TABLET, FILM COATED ORAL at 20:46

## 2018-08-15 RX ADMIN — SODIUM CHLORIDE 500 ML: 9 INJECTION, SOLUTION INTRAVENOUS at 16:15

## 2018-08-15 RX ADMIN — FAMOTIDINE 20 MG: 20 TABLET ORAL at 10:21

## 2018-08-15 RX ADMIN — MORPHINE SULFATE 2 MG: 2 INJECTION, SOLUTION INTRAMUSCULAR; INTRAVENOUS at 02:02

## 2018-08-15 RX ADMIN — PROMETHAZINE HYDROCHLORIDE 25 MG: 25 INJECTION INTRAMUSCULAR; INTRAVENOUS at 04:36

## 2018-08-15 RX ADMIN — MAGNESIUM SULFATE IN WATER 2 G: 40 INJECTION, SOLUTION INTRAVENOUS at 20:46

## 2018-08-15 RX ADMIN — FAMOTIDINE 20 MG: 20 TABLET ORAL at 20:46

## 2018-08-15 RX ADMIN — CARBAMAZEPINE 100 MG: 100 TABLET, EXTENDED RELEASE ORAL at 10:23

## 2018-08-15 RX ADMIN — ONDANSETRON HYDROCHLORIDE 4 MG: 2 INJECTION, SOLUTION INTRAMUSCULAR; INTRAVENOUS at 02:02

## 2018-08-15 RX ADMIN — CARBAMAZEPINE 100 MG: 100 TABLET, EXTENDED RELEASE ORAL at 20:47

## 2018-08-15 RX ADMIN — ONDANSETRON HYDROCHLORIDE 4 MG: 2 INJECTION, SOLUTION INTRAMUSCULAR; INTRAVENOUS at 20:47

## 2018-08-15 ASSESSMENT — PAIN SCALES - GENERAL
PAINLEVEL_OUTOF10: 10
PAINLEVEL_OUTOF10: 9
PAINLEVEL_OUTOF10: 9
PAINLEVEL_OUTOF10: 10
PAINLEVEL_OUTOF10: 0
PAINLEVEL_OUTOF10: 6
PAINLEVEL_OUTOF10: 7
PAINLEVEL_OUTOF10: 9
PAINLEVEL_OUTOF10: 5

## 2018-08-15 ASSESSMENT — PAIN DESCRIPTION - ONSET
ONSET: ON-GOING
ONSET: ON-GOING

## 2018-08-15 ASSESSMENT — PAIN DESCRIPTION - DESCRIPTORS
DESCRIPTORS: ACHING
DESCRIPTORS: POUNDING
DESCRIPTORS: ACHING

## 2018-08-15 ASSESSMENT — PAIN DESCRIPTION - PAIN TYPE
TYPE: SURGICAL PAIN
TYPE: ACUTE PAIN

## 2018-08-15 ASSESSMENT — PAIN DESCRIPTION - FREQUENCY
FREQUENCY: CONTINUOUS

## 2018-08-15 ASSESSMENT — PAIN DESCRIPTION - LOCATION
LOCATION: HIP
LOCATION: HIP
LOCATION: HEAD
LOCATION: ABDOMEN

## 2018-08-15 ASSESSMENT — PAIN DESCRIPTION - PROGRESSION
CLINICAL_PROGRESSION: NOT CHANGED
CLINICAL_PROGRESSION: NOT CHANGED

## 2018-08-15 ASSESSMENT — PAIN DESCRIPTION - ORIENTATION
ORIENTATION: LEFT
ORIENTATION: LEFT

## 2018-08-15 NOTE — PLAN OF CARE
Problem: Falls - Risk of:  Goal: Will remain free from falls  Will remain free from falls   Outcome: Met This Shift  Pt has been free from falls this shift, bed alarm on, bed in lowest position, 2/4 side rails up, nonskid socks on, wheels locked, bedside table and call light in reach. Encouraged pt to call out if needed anything. Problem: Pain:  Goal: Pain level will decrease  Pain level will decrease   Outcome: Ongoing  Pt c/o severe pain in left hip, medicated per mar with prn morphine. Pt verbalized relief and is now resting in bed. Will continue to assess pain level.

## 2018-08-15 NOTE — PROGRESS NOTES
HOSPITAL MEDICINE  - PROGRESS NOTE    Admit Date: 8/13/2018         Interval History: 78yof who presented with Left knee pain and was  found to have left femoral neck fx.  -her dog had pulled her down and she fell onto her left side  - no loss consciousness or other injuries. POD 0 s/p L hip hemiarthroplasty     Objective: pain uncontrolled  Having nausea and loose BM-once this am.Says she had some overnight  Tells me this is normal for her. Had a C'scope last year-per pt. Mild abd discomfort. Diet: DIET GENERAL;  Pain is: Moderate  Nausea: Moderate  Bowel Movement/Flatus yes    Data:   Scheduled Meds: Reviewed  Continuous Infusions:   sodium chloride 75 mL/hr at 08/15/18 1022       Intake/Output Summary (Last 24 hours) at 08/15/18 1128  Last data filed at 08/15/18 9383   Gross per 24 hour   Intake             4178 ml   Output             2250 ml   Net             1928 ml     CBC:   Recent Labs      08/14/18   0828  08/15/18   0647   WBC  8.4   --    HGB  9.1*  9.3*   PLT  189   --      BMP:  Recent Labs      08/14/18   0828   NA  133*   K  4.2   CL  97*   CO2  28   BUN  13   CREATININE  0.8   GLUCOSE  90   INR:   Recent Labs      08/13/18   1500   INR  0.96         Objective:   Vitals: BP (!) 146/76   Pulse 115   Temp 99.9 °F (37.7 °C) (Oral)   Resp 16   Ht 5' 2.01\" (1.575 m)   Wt 128 lb (58.1 kg)   SpO2 95%   BMI 23.41 kg/m²   General appearance: alert, appears stated age and cooperative  Skin: Skin color, texture, turgor normal.   HEENT: Head: Normocephalic, no lesions, without obvious abnormality.   Neck: supple  Lungs: clear to auscultation bilaterally  Heart: regular rate and rhythm, S1, S2 normal, no murmur, click, rub or gallop  Abdomen: soft, non-tender; bowel sounds normal; no masses,  no organomegaly  Extremities: LLE  Thigh swelling    Neurologic: Mental status: Alert, oriented, thought content appropriate      Assessment & Plan: Closed fracture of neck of left femur (HCC) - secondary to a mechanical fall s/p L hip hemiarthroplasty   Pain control  PT/OT    Hyponatremia mild. On HCTZ. AM labs pending     Essential  hypertension - hold Lisinopril and HCTZ preop.   monitor blood pressure.     Acquired hypothyroidism -   -on Levothyroxine. Anemia-acute on chronic.   Check Iron studies and give venofer      dvt prophy    Disp-likely dc in am    Gerard Poon MD

## 2018-08-15 NOTE — PROGRESS NOTES
Occupational Therapy   Occupational Therapy Initial Assessment/tx  Date: 8/15/2018   Patient Name: Juan Pablo Gonzalez  MRN: [id number]     : 1940    Date of Service: 8/15/2018    Discharge Recommendations: Juan Pablo Gonzalez scored a 15/24 on the AM-PAC ADL Inpatient form. Current research shows that an AM-PAC score of 17 or less is typically not associated with a discharge to the patient's home setting. Based on the patients AM-PAC score and their current ADL deficits, it is recommended that the patient have 3-5 sessions per week of Occupational Therapy at d/c to increase the patients independence. OT Equipment Recommendations  Equipment Needed: No      Patient Diagnosis(es): The encounter diagnosis was Closed fracture of left hip, initial encounter ([facility] [address] has a past medical history of Arthritis; Closed fracture of neck of left femur ([facility] [address] Degenerative disc disease; Hypertension; Thyroid disease; and Trigeminal nerve disease or syndrome. has a past surgical history that includes sinus surgery; shoulder surgery; joint replacement; other surgical history; laminectomy (1/3/11); back surgery; Tonsillectomy; and pr partial hip replacement (Left, 2018). Treatment Diagnosis: impaired ADLs and transfers secondary to L hip fx, s/p THR      Restrictions  Position Activity Restriction  Other position/activity restrictions: Up with assist; Full WB; Anterior Hip precautions: No hip adduction beyond neutral, no external rotation and no hyperextension    Subjective   General  Additional Pertinent Hx: PMH:  B TKR, DJD, trigeminal nerve dz, thyroid dz, back surgery, R rotator cuff surgery  Family / Caregiver Present: Yes (daughter and family)  Diagnosis: Pt admitted after falling and sustaining L femoral neck fx, s/p L hemiarthroplasty  Subjective  Subjective: Pt in bed, agreeable to work with OT.   Pain Assessment  Patient Currently in Pain: Yes (R knee pain, not rated, had pain meds) functional mobility ; Decreased ADL status; Decreased ROM  Assessment: Pt is functioning below baseline level, currently dep with LE ADLs, and needing A of 2 with all mobility. Recommend ongoing OT at discharge. Treatment Diagnosis: impaired ADLs and transfers secondary to L hip fx, s/p THR  Prognosis: Good  Decision Making: Medium Complexity  Patient Education: role of OT, importance of activity-verbalized understanding  REQUIRES OT FOLLOW UP: Yes  Activity Tolerance  Activity Tolerance: Patient limited by fatigue;Patient limited by pain  Safety Devices  Safety Devices in place: Yes  Type of devices: Left in chair;Nurse notified;Call light within reach; Chair alarm in place         Plan   Plan  Times per week: 7  Times per day: Daily  Current Treatment Recommendations: ROM, Balance Training, Functional Mobility Training, Endurance Training, Self-Care / ADL    G-Code  OT G-codes  Functional Limitation: Self care  Self Care Current Status (): At least 40 percent but less than 60 percent impaired, limited or restricted  Self Care Goal Status ():  At least 20 percent but less than 40 percent impaired, limited or restricted  OutComes Score                                           AM-PAC Score        AM-[facility] Inpatient Daily Activity Raw Score: 15  AM-PAC Inpatient ADL T-Scale Score : 34.69  ADL Inpatient CMS 0-100% Score: 56.46  ADL Inpatient CMS G-Code Modifier : CK    Goals  Short term goals  Time Frame for Short term goals: discharge  Short term goal 1: pt to transfer to 3 in 1 commode with Sung of 2  Short term goal 2: pt to stand for 3 minutes with Sung to increase ADLs  Short term goal 3: pt to participate in LE dressing assessment  Short term goal 4: pt to do simple grooming with set up  Short term goal 5: pt to verbalize anter THR prec with min cues  Patient Goals   Patient goals : not stated       Therapy Time   Individual Concurrent Group Co-treatment   Time In 1405         Time Out 1445         Minutes 40           Timed Code Treatment Minutes:   25    Total Treatment Minutes:  40       If patient is d/c prior to next treatment session, this note will serve as the discharge summary  Kirstie Ji, [address] [facility]

## 2018-08-15 NOTE — PROGRESS NOTES
[address]  Progress Note  Total Knee Arthroplasty     Date:  8/15/2018    Name:  Osvaldo Justice  Address:  [place] [id number]    :  1940      Age:   69 y.o.    SSN:  [id number]      Medical Record Number:  [id number]    SUBJECTIVE:    Osvaldo Justice is a 69y.o. year old female who is now POD # 1  s/p Left Hip hemiarthroplasty. Pain was reasonably controlled overnight. Has not performed PT or ambulation. She complains of nausea this AM. No fever or chills. OBJECTIVE:  Infusions:   sodium chloride 75 mL/hr at 18 2132       I/O:I/O last 3 completed shifts: In: 6034 [P.O.:50; I.V.:3478; Blood:650]  Out: 2250 [Urine:1550; Blood:700]           Wt Readings from Last 1 Encounters:   18 128 lb (58.1 kg)        LABS:    Recent Labs      18   1459  18   0828  08/15/18   0647   WBC  6.4  8.4   --    HGB  10.6*  9.1*  9.3*   HCT  31.5*  26.4*  27.1*   MCV  92.6  92.3   --    PLT  242  189   --         Recent Labs      18   1459  18   0828   NA  134*  133*   K  4.3  4.2   CL  95*  97*   CO2  29  28   PHOS  3.2   --    BUN  15  13   CREATININE  0.7  0.8      No results for input(s): AST, ALT, ALB, BILIDIR, BILITOT, ALKPHOS in the last 72 hours. No results for input(s): LIPASE, AMYLASE in the last 72 hours. Recent Labs      18   1500   INR  0.96   APTT  30.0      No results for input(s): CKTOTAL, CKMB, CKMBINDEX, TROPONINI in the last 72 hours. Exam:BP (!) 165/81   Pulse 111   Temp 98.7 °F (37.1 °C) (Oral)   Resp 16   Ht 5' 2.01\" (1.575 m)   Wt 128 lb (58.1 kg)   SpO2 93%   BMI 23.41 kg/m²    General appearance: Alert, oriented, NAD, and cooperative   Lungs: Breathing is unlabored, no audible wheezes   Heart: Regular rate and rhythm   Abdomen: soft, nontender   Neuro: Sensation is intact throughout foot. Vascular: Toes are well perfused.       left hip exam: dressing

## 2018-08-15 NOTE — CARE COORDINATION
TRANSITIONAL CARE NURSE SUMMARY    Name: Remedios Peguero                                                                             : [id number]        /Age:  69 y.o.      / Sex: female                           PCP: Charleen Nalis MD  PCP phone: [phone]           PCP fax: [phone]    Admit date: 2018 Admission diagnosis: Closed fracture of neck of left femur, initial encounter ([facility] [address] [S72.002A]      Risk Score:  17%  30 Day Readmission: No    Previous site of care: home     Previous baseline functional status: independent       Transitional Care Patient Interview:    Spoke with patient and her daughter, Derek Lynch, regarding discharge plans. Pt lives in house with her daughter and several other family members. Pt/Cg both agree that Pt will need to go to a rehab facility when discharge from hospital. TC nurse has provided patient with list of SNF's near patients home in [place] per her request, daughter will review list and call with choice tomorrow morning. Pt will need transport to facility. AM-PAC scores OT-15/24, PT- pending     Patients health goals: \"get better\"  Family support at home: Yes  Able to afford medications: Yes  Able to provide transportation to appointments: Yes  Any barriers: Yes - mobility/pain        Plan of Care:  Possible Next Site of Care: Skilled Facility   If  possibility of SNF, SNF: expected LOS: 18-21 days   If possibility of [facility], name of agency: n/a    Diet: DIET GENERAL;  Current DME equipment: straight cane, walker, rollator, shower chair   DME Provider: n/a    Follow Up Appointment:s:  No future appointments.     TCRN Interventions:  Reviewed chart  Assessed patients and/or caregiver understanding of medical condition(s)  Educated patient and/or caregiver of medical condition(s)   Reviewed plan of care to date  Continued collaboration with janae care team members on appropriate next site of care       [facility]  Transitional Care [facility] [place]

## 2018-08-15 NOTE — PROGRESS NOTES
Physical Therapy    Facility/Department: [facility] 5T ORTHO/NEURO  Initial Assessment and Treatment    NAME: Prasanth Hernandez  : 1940  MRN: [id number]    Date of Service: 8/15/2018    Discharge Recommendations:  Prasanth Hernandez scored a 6/24 on the AM-PAC short mobility form. Current research shows that an AM-PAC score of 17 or less is typically not associated with a discharge to the patient's home setting. Based on the patients AM-PAC score and their current functional mobility deficits, it is recommended that the patient have 3-5 sessions per week of Physical Therapy at d/c to increase the patients independence. PT Equipment Recommendations  Equipment Needed: No      Restrictions  Up with assist; Full WB; Anterior Hip precautions: No hip adduction beyond neutral, no external rotation and no hyperextension    Vision/Hearing  Vision: Within Functional Limits  Hearing: Within functional limits       Subjective  Chart Reviewed: Yes  Additional Pertinent Hx: Pt admitted  s/p fall while walking her dog, resulting in (+) Displaced transcervical left femoral neck fracture. Pt to OR  s/p left hip hemiarthroplasty. PMH:  OA, DDD, HTN, trigeminal nerve diease, back surgery, L knee TKR  Diagnosis: L femoral neck fx    Subjective  Subjective: Pt found supine in bed, family in room. Pt pleasant and agreeable for PT.     Pain Screening  Patient Currently in Pain: Yes (R knee pain, not rated, had pain meds)    Orientation  Within Functional Limits    Social/Functional History  Lives With: Daughter (and daughter's family)  Type of Home: House  Home Layout: Multi-level (down flight of steps with railing to bedroom/bahtroom)  Home Access: Stairs to enter without rails (2)  Bathroom Shower/Tub: Tub/Shower unit  Bathroom Toilet: Standard  Bathroom Equipment: Toilet raiser, Grab bars around toilet  Home Equipment: Cane, Rolling walker  ADL Assistance: Needs assistance (indep dressing, A with showers)  Homemaking

## 2018-08-16 LAB
ALBUMIN SERPL-MCNC: 2.6 G/DL (ref 3.4–5)
ANION GAP SERPL CALCULATED.3IONS-SCNC: 8 MMOL/L (ref 3–16)
BASOPHILS ABSOLUTE: 0 K/UL (ref 0–0.2)
BASOPHILS ABSOLUTE: 0.1 K/UL (ref 0–0.2)
BASOPHILS RELATIVE PERCENT: 0 %
BASOPHILS RELATIVE PERCENT: 0.5 %
BLOOD BANK DISPENSE STATUS: NORMAL
BLOOD BANK PRODUCT CODE: NORMAL
BPU ID: NORMAL
BUN BLDV-MCNC: 14 MG/DL (ref 7–20)
CALCIUM SERPL-MCNC: 7.8 MG/DL (ref 8.3–10.6)
CHLORIDE BLD-SCNC: 100 MMOL/L (ref 99–110)
CO2: 24 MMOL/L (ref 21–32)
CREAT SERPL-MCNC: 0.8 MG/DL (ref 0.6–1.2)
DESCRIPTION BLOOD BANK: NORMAL
EOSINOPHILS ABSOLUTE: 0.1 K/UL (ref 0–0.6)
EOSINOPHILS ABSOLUTE: 0.3 K/UL (ref 0–0.6)
EOSINOPHILS RELATIVE PERCENT: 1 %
EOSINOPHILS RELATIVE PERCENT: 2.2 %
GFR AFRICAN AMERICAN: >60
GFR NON-AFRICAN AMERICAN: >60
GLUCOSE BLD-MCNC: 111 MG/DL (ref 70–99)
HCT VFR BLD CALC: 19 % (ref 36–48)
HCT VFR BLD CALC: 21.1 % (ref 36–48)
HEMOGLOBIN: 6.4 G/DL (ref 12–16)
HEMOGLOBIN: 6.7 G/DL (ref 12–16)
HEMOGLOBIN: 7.2 G/DL (ref 12–16)
LACTATE DEHYDROGENASE: 172 U/L (ref 100–190)
LYMPHOCYTES ABSOLUTE: 0.8 K/UL (ref 1–5.1)
LYMPHOCYTES ABSOLUTE: 1 K/UL (ref 1–5.1)
LYMPHOCYTES RELATIVE PERCENT: 6 %
LYMPHOCYTES RELATIVE PERCENT: 8.1 %
MCH RBC QN AUTO: 31.2 PG (ref 26–34)
MCH RBC QN AUTO: 31.3 PG (ref 26–34)
MCHC RBC AUTO-ENTMCNC: 33.7 G/DL (ref 31–36)
MCHC RBC AUTO-ENTMCNC: 33.9 G/DL (ref 31–36)
MCV RBC AUTO: 92.4 FL (ref 80–100)
MCV RBC AUTO: 92.5 FL (ref 80–100)
MONOCYTES ABSOLUTE: 1.4 K/UL (ref 0–1.3)
MONOCYTES ABSOLUTE: 1.7 K/UL (ref 0–1.3)
MONOCYTES RELATIVE PERCENT: 10 %
MONOCYTES RELATIVE PERCENT: 13.2 %
NEUTROPHILS ABSOLUTE: 11.5 K/UL (ref 1.7–7.7)
NEUTROPHILS ABSOLUTE: 9.7 K/UL (ref 1.7–7.7)
NEUTROPHILS RELATIVE PERCENT: 76 %
NEUTROPHILS RELATIVE PERCENT: 83 %
PDW BLD-RTO: 13.4 % (ref 12.4–15.4)
PDW BLD-RTO: 13.9 % (ref 12.4–15.4)
PHOSPHORUS: 2.6 MG/DL (ref 2.5–4.9)
PLATELET # BLD: 110 K/UL (ref 135–450)
PLATELET # BLD: 111 K/UL (ref 135–450)
PLATELET SLIDE REVIEW: ABNORMAL
PMV BLD AUTO: 7.4 FL (ref 5–10.5)
PMV BLD AUTO: 7.7 FL (ref 5–10.5)
POTASSIUM REFLEX MAGNESIUM: 3.9 MMOL/L (ref 3.5–5.1)
POTASSIUM SERPL-SCNC: 3.9 MMOL/L (ref 3.5–5.1)
RBC # BLD: 2.06 M/UL (ref 4–5.2)
RBC # BLD: 2.29 M/UL (ref 4–5.2)
SODIUM BLD-SCNC: 132 MMOL/L (ref 136–145)
WBC # BLD: 12.8 K/UL (ref 4–11)
WBC # BLD: 13.9 K/UL (ref 4–11)

## 2018-08-16 PROCEDURE — 36430 TRANSFUSION BLD/BLD COMPNT: CPT

## 2018-08-16 PROCEDURE — 97530 THERAPEUTIC ACTIVITIES: CPT

## 2018-08-16 PROCEDURE — 6360000002 HC RX W HCPCS: Performed by: INTERNAL MEDICINE

## 2018-08-16 PROCEDURE — 85018 HEMOGLOBIN: CPT

## 2018-08-16 PROCEDURE — 80069 RENAL FUNCTION PANEL: CPT

## 2018-08-16 PROCEDURE — 85025 COMPLETE CBC W/AUTO DIFF WBC: CPT

## 2018-08-16 PROCEDURE — 6370000000 HC RX 637 (ALT 250 FOR IP): Performed by: INTERNAL MEDICINE

## 2018-08-16 PROCEDURE — 6360000002 HC RX W HCPCS: Performed by: ORTHOPAEDIC SURGERY

## 2018-08-16 PROCEDURE — 97116 GAIT TRAINING THERAPY: CPT

## 2018-08-16 PROCEDURE — 83615 LACTATE (LD) (LDH) ENZYME: CPT

## 2018-08-16 PROCEDURE — P9016 RBC LEUKOCYTES REDUCED: HCPCS

## 2018-08-16 PROCEDURE — 86923 COMPATIBILITY TEST ELECTRIC: CPT

## 2018-08-16 PROCEDURE — 2580000003 HC RX 258: Performed by: INTERNAL MEDICINE

## 2018-08-16 PROCEDURE — 1200000000 HC SEMI PRIVATE

## 2018-08-16 PROCEDURE — 2580000003 HC RX 258: Performed by: FAMILY MEDICINE

## 2018-08-16 PROCEDURE — 2580000003 HC RX 258: Performed by: ORTHOPAEDIC SURGERY

## 2018-08-16 PROCEDURE — 36415 COLL VENOUS BLD VENIPUNCTURE: CPT

## 2018-08-16 PROCEDURE — 97110 THERAPEUTIC EXERCISES: CPT

## 2018-08-16 RX ORDER — ACETAMINOPHEN 325 MG/1
650 TABLET ORAL EVERY 4 HOURS PRN
Qty: 120 TABLET | Refills: 3 | DISCHARGE
Start: 2018-08-16

## 2018-08-16 RX ORDER — 0.9 % SODIUM CHLORIDE 0.9 %
250 INTRAVENOUS SOLUTION INTRAVENOUS ONCE
Status: COMPLETED | OUTPATIENT
Start: 2018-08-16 | End: 2018-08-16

## 2018-08-16 RX ORDER — IBUPROFEN 200 MG
400 TABLET ORAL EVERY 6 HOURS PRN
Status: DISCONTINUED | OUTPATIENT
Start: 2018-08-16 | End: 2018-08-20

## 2018-08-16 RX ORDER — OXYCODONE HYDROCHLORIDE AND ACETAMINOPHEN 5; 325 MG/1; MG/1
1 TABLET ORAL EVERY 6 HOURS PRN
Qty: 12 TABLET | Refills: 0 | Status: SHIPPED | OUTPATIENT
Start: 2018-08-16 | End: 2018-08-23

## 2018-08-16 RX ORDER — FUROSEMIDE 10 MG/ML
20 INJECTION INTRAMUSCULAR; INTRAVENOUS SEE ADMIN INSTRUCTIONS
Status: DISCONTINUED | OUTPATIENT
Start: 2018-08-16 | End: 2018-08-21 | Stop reason: HOSPADM

## 2018-08-16 RX ORDER — 0.9 % SODIUM CHLORIDE 0.9 %
500 INTRAVENOUS SOLUTION INTRAVENOUS ONCE
Status: COMPLETED | OUTPATIENT
Start: 2018-08-16 | End: 2018-08-16

## 2018-08-16 RX ADMIN — FAMOTIDINE 20 MG: 20 TABLET ORAL at 21:36

## 2018-08-16 RX ADMIN — LEVOTHYROXINE SODIUM 100 MCG: 100 TABLET ORAL at 05:30

## 2018-08-16 RX ADMIN — SODIUM CHLORIDE 500 ML: 9 INJECTION, SOLUTION INTRAVENOUS at 11:04

## 2018-08-16 RX ADMIN — ENOXAPARIN SODIUM 40 MG: 40 INJECTION SUBCUTANEOUS at 09:15

## 2018-08-16 RX ADMIN — Medication 10 ML: at 09:15

## 2018-08-16 RX ADMIN — OXYCODONE HYDROCHLORIDE AND ACETAMINOPHEN 2 TABLET: 5; 325 TABLET ORAL at 05:30

## 2018-08-16 RX ADMIN — ENOXAPARIN SODIUM 40 MG: 40 INJECTION SUBCUTANEOUS at 21:37

## 2018-08-16 RX ADMIN — GABAPENTIN 1200 MG: 600 TABLET, FILM COATED ORAL at 09:15

## 2018-08-16 RX ADMIN — GABAPENTIN 1200 MG: 600 TABLET, FILM COATED ORAL at 13:41

## 2018-08-16 RX ADMIN — OXYCODONE HYDROCHLORIDE AND ACETAMINOPHEN 2 TABLET: 5; 325 TABLET ORAL at 15:12

## 2018-08-16 RX ADMIN — CETIRIZINE HYDROCHLORIDE 10 MG: 10 TABLET, FILM COATED ORAL at 09:15

## 2018-08-16 RX ADMIN — SODIUM CHLORIDE 250 ML: 9 INJECTION, SOLUTION INTRAVENOUS at 22:04

## 2018-08-16 RX ADMIN — GABAPENTIN 1200 MG: 600 TABLET, FILM COATED ORAL at 21:36

## 2018-08-16 RX ADMIN — CARBAMAZEPINE 100 MG: 100 TABLET, EXTENDED RELEASE ORAL at 21:44

## 2018-08-16 RX ADMIN — OXYCODONE HYDROCHLORIDE AND ACETAMINOPHEN 2 TABLET: 5; 325 TABLET ORAL at 10:04

## 2018-08-16 RX ADMIN — FAMOTIDINE 20 MG: 20 TABLET ORAL at 09:15

## 2018-08-16 RX ADMIN — CARBAMAZEPINE 100 MG: 100 TABLET, EXTENDED RELEASE ORAL at 09:15

## 2018-08-16 RX ADMIN — FLUTICASONE PROPIONATE 2 SPRAY: 50 SPRAY, METERED NASAL at 09:15

## 2018-08-16 RX ADMIN — IRON SUCROSE 200 MG: 20 INJECTION, SOLUTION INTRAVENOUS at 12:48

## 2018-08-16 RX ADMIN — Medication 10 ML: at 09:16

## 2018-08-16 ASSESSMENT — PAIN DESCRIPTION - PROGRESSION
CLINICAL_PROGRESSION: GRADUALLY WORSENING
CLINICAL_PROGRESSION: GRADUALLY WORSENING
CLINICAL_PROGRESSION: NOT CHANGED

## 2018-08-16 ASSESSMENT — PAIN DESCRIPTION - DESCRIPTORS
DESCRIPTORS: ACHING

## 2018-08-16 ASSESSMENT — PAIN DESCRIPTION - LOCATION
LOCATION: HIP

## 2018-08-16 ASSESSMENT — PAIN DESCRIPTION - FREQUENCY
FREQUENCY: CONTINUOUS

## 2018-08-16 ASSESSMENT — PAIN DESCRIPTION - ORIENTATION
ORIENTATION: LEFT

## 2018-08-16 ASSESSMENT — PAIN SCALES - GENERAL
PAINLEVEL_OUTOF10: 7
PAINLEVEL_OUTOF10: 0
PAINLEVEL_OUTOF10: 7
PAINLEVEL_OUTOF10: 7
PAINLEVEL_OUTOF10: 5

## 2018-08-16 ASSESSMENT — PAIN DESCRIPTION - PAIN TYPE
TYPE: SURGICAL PAIN

## 2018-08-16 ASSESSMENT — PAIN DESCRIPTION - ONSET
ONSET: ON-GOING

## 2018-08-16 NOTE — PROGRESS NOTES
Occupational Therapy  Facility/Department: [facility] 5T ORTHO/NEURO  Daily Treatment Note  NAME: Pieter Velez  : 1940  MRN: [id number]    Date of Service: 2018    Discharge Recommendations:  Pieter Velez scored a 15/24 on the AM-PAC ADL Inpatient form. Current research shows that an AM-PAC score of 17 or less is typically not associated with a discharge to the patient's home setting. Based on the patients AM-PAC score and their current ADL deficits, it is recommended that the patient have 3-5 sessions per week of Occupational Therapy at d/c to increase the patients independence. OT Equipment Recommendations  Other: defer recommendations to next level of care    Patient Diagnosis(es): The encounter diagnosis was Closed fracture of left hip, initial encounter ([facility] [address] has a past medical history of Arthritis; Closed fracture of neck of left femur ([facility] [address] Degenerative disc disease; Hypertension; Thyroid disease; and Trigeminal nerve disease or syndrome. has a past surgical history that includes sinus surgery; shoulder surgery; joint replacement; other surgical history; laminectomy (1/3/11); back surgery; Tonsillectomy; and pr partial hip replacement (Left, 2018). Restrictions  Position Activity Restriction  Other position/activity restrictions: Up with assist; Full WB; Anterior Hip precautions: No hip adduction beyond neutral, no external rotation and no hyperextension  Subjective   General  Chart Reviewed: Yes  Additional Pertinent Hx: PMH:  B TKR, DJD, trigeminal nerve dz, thyroid dz, back surgery, R rotator cuff surgery  Family / Caregiver Present: No  Diagnosis: Pt admitted after falling and sustaining L femoral neck fx, s/p L hemiarthroplasty  Subjective  Subjective: Sitting up in chair on entry. Agreeable to therapy. \"I'm feeling better today. \" (at beginning)  \"I'm very dizzy\" (when in bathroom and requesting back to bed).       Orientation  Orientation  Overall Orientation Status: Within Functional Limits (oriented with conversation)  Objective    ADL  LE Dressing: Dependent/Total (trunk flexion x 3 w/ reach to midcalf area (with much effort and discomfort))        Balance  Standing Balance: Moderate assistance of 1 vs. Min A of 2 (static standing at walker or at Ruthellen Skillern)    Functional Mobility  Functional - Mobility Device: Rolling Walker  Activity: Other (~ 4 feet mobility forward (with chair in tow))  Assist Level: Dependent/Total (Min A of 2 )  Functional Mobility Comments: Note: chair in tow; then via Colinlen Skillern to toilet. Pt w/ c/o feeling 'lightheaded' and requesting return to bed (when Ruthellen Skillern positioned in front of toilet). Back to bed via [facility] Skillern - BP upon returning to supine (88/57). Nurse called and notified. Bed mobility  Sit to Supine: Dependent/Total (dependent of 2 (assisted quickly 2* lightheaded))  Scooting: Dependent/Total (assist of 2 to scoot up in bed)  Transfers  Sit to stand: Dependent/Total (Mod A + Min A (from chair); Min A (from seat of Samule Ramesh x 2 times))  Stand to sit: Minimal assistance (demo good control of descent)                       Cognition  Overall Cognitive Status: [facility]                                         Assessment   Performance deficits / Impairments: Decreased functional mobility ; Decreased ADL status; Decreased ROM  Assessment: Less assist w/ transfers today and able to ambulate 4 feet w/ chair in tow. Pt did have episode of symptomatic hypotension BP (88/57) upon returning to supine. Plans are for SNF at discharge. Recommend ongoing OT at discharge. Continue per POC.   Treatment Diagnosis: impaired ADLs and transfers secondary to L hip fx, s/p THR  Prognosis: Good  Patient Education: role of OT, importance of activity-verbalized understanding; educated about hip precautions - ongoing education and reinforcement  REQUIRES OT FOLLOW UP: Yes  Activity Tolerance  Activity Tolerance: Patient limited by fatigue;Treatment

## 2018-08-16 NOTE — PLAN OF CARE
Problem: Falls - Risk of:  Goal: Will remain free from falls  Will remain free from falls   Outcome: Met This Shift  Pt has been free from falls this shift, bed alarm on, bed in lowest position, 2/4 side rails up, nonskid socks on, wheels locked, bedside table and call light in reach. Encouraged pt to call out if needed anything. Problem: Pain:  Goal: Pain level will decrease  Pain level will decrease   Outcome: Ongoing  Pt c/o severe pain in left hip, medicated per mar with prn percocet. Pt verbalized relief and is now resting in bed. Will continue to assess pain level. Problem: Risk for Impaired Skin Integrity  Goal: Tissue integrity - skin and mucous membranes  Structural intactness and normal physiological function of skin and  mucous membranes. Outcome: Ongoing  Pt is being turned and repositioned q2h with pillow support. Buttocks has no evidence of breakdown. Will continue to assess skin integrity.

## 2018-08-16 NOTE — CARE COORDINATION
TRANSITIONAL CARE NURSE SUMMARY    Name: Art Pedraza                                                                             : [id number]        /Age:  69 y.o.      / Sex: female                           PCP: Drake Gibbs MD  PCP phone: [phone]           PCP fax: [phone]    Admit date: 2018 Admission diagnosis: Closed fracture of neck of left femur, initial encounter ([facility] [address] [S72.002A]    Risk Score:  17%  30 Day Readmission: No    Transitional Care Patient Interview:  Received call from [facility] this am with SNF's choices:   1st choice is [facility] ([address], [address], phone # [phone]). 2nd choice is [facility] ([address], [address], phone # [phone]. Will notify 5th floor case management- Sterling Rankin      No future appointments.     TCRN Interventions:  Reviewed chart  Assessed patients and/or caregiver understanding of medical condition(s)  Educated patient and/or caregiver of medical condition(s)   Reviewed plan of care to date  Continued collaboration with janae care team members on appropriate next site of care       [facility]  Transitional Care RN  Benny Dillon  [phone]

## 2018-08-16 NOTE — PROGRESS NOTES
dressing is clean, dry, and intact. Thigh is soft and NT. 5+ DF/PF strength. 2+ palp PT/DP pulses. Calf soft and non-tender with negative homans. NVI. ASSESSMENT:   POD # 2 s/p Left Hip hemiarthroplasty    PLAN:    Analgesia: Pain control with combination of oral and IV analgesics PRN. Medications: Zofran for nausea   Dressings: Please leave in place, ice packs per RN   DVT Prophylaxis: Lovenox/Mechanical   PT: Antonio Anatoly will participate in physical therapy today with emphasis on ambulation. Disposition: Dispo when cleared by PT, and medically stable for discharge. Marly Thurston IV, D.O. Clinical Fellow, [address] [place] [address]  8/16/2018    Orthopaedic Surgery Attending Addendum:    Patient seen and examined independently. Agree with Dr. García Loop note. Doing well clinically on POD2. Continue plan as above    Today, however, she did have some orthostatic hypotension which was treated with saline bolus.   Hgb went from 9.2-->7.2 this AM.  In light of large amount of bleeding noted intra-op (700mL with 350cc net infusion intra-op plus 1u pRBC intra-op), recommend keeping patient 1 more night to ensure Hgb stable     D/W Dr. Lisbeth Brooks MD  [phone]

## 2018-08-16 NOTE — PROGRESS NOTES
HOSPITAL MEDICINE  - PROGRESS NOTE    Admit Date: 8/13/2018         Interval History: 78yof who presented with Left knee pain and was  found to have left femoral neck fx.  -her dog had pulled her down and she fell onto her left side  - no loss consciousness or other injuries. Subjective: Pt reports dizziness, nursing reports hypotension    POD 0 s/p L hip hemiarthroplasty     Objective: pain uncontrolled  Having nausea and loose BM-once this am.Says she had some overnight  Tells me this is normal for her. Had a C'scope last year-per pt. Mild abd discomfort. Diet: DIET GENERAL;  Pain is: Moderate  Nausea: Moderate  Bowel Movement/Flatus yes    Data:   Scheduled Meds: Reviewed  Continuous Infusions:   sodium chloride 75 mL/hr at 08/15/18 2046       Intake/Output Summary (Last 24 hours) at 08/16/18 1057  Last data filed at 08/16/18 0505   Gross per 24 hour   Intake              240 ml   Output             1025 ml   Net             -785 ml     CBC:   Recent Labs      08/16/18   0637   WBC  12.8*   HGB  7.2*   PLT  111*     BMP:  Recent Labs      08/16/18   0637   NA  132*   K  3.9  3.9   CL  100   CO2  24   BUN  14   CREATININE  0.8   GLUCOSE  111*   INR:   Recent Labs      08/13/18   1500   INR  0.96         Objective:   Vitals: BP (!) 88/57   Pulse 96   Temp 98.7 °F (37.1 °C) (Oral)   Resp 16   Ht 5' 2.01\" (1.575 m)   Wt 128 lb (58.1 kg)   SpO2 98%   BMI 23.41 kg/m²   General appearance: WD/WN 69y.o. year-old  female who is alert, appears stated age and is cooperative  HEENT: Head: Normocephalic, no lesions, without obvious abnormality. Eye: Normal external eye, conjunctiva, lids cornea, MYRON. Ears: Normal TM's bilaterally. Normal auditory canals and external ears. Non-tender. Nose: Normal external nose, mucus membranes and septum. Pharynx: Dental Hygiene adequate. Normal buccal mucosa. Normal pharynx.   Neck: no adenopathy, no carotid bruit, no JVD, supple, symmetrical, trachea midline and thyroid not enlarged, symmetric, no tenderness/mass/nodules  Lungs: clear to auscultation bilaterally and no use of accessory muscles. Heart: regular rate and rhythm, S1, S2 normal, no murmur, click, rub or gallop and normal apical impulse  Abdomen: soft, non-tender; bowel sounds normal; no masses,  no organomegaly  Extremities: extremities normal, atraumatic, no cyanosis or edema and Homans sign is negative, no sign of DVT  Skin: Skin color, texture, turgor normal. No rashes or lesions  Neurologic: Mental status: Alert, oriented, thought content appropriate, Cranial nerves II-XII intact; grossly non focal. Gait was not tested. Assessment & Plan:    Closed fracture of neck of left femur (HCC) - secondary to a mechanical fall s/p L hip hemiarthroplasty   Pain control  PT/OT    Hypotension (symptomatic) - 500 ml bolus of saline ordered'    Hyponatremia mild. On HCTZ. - Stable an asymptomatic     Essential  hypertension - held Lisinopril and HCTZ preop.   monitor blood pressure.     Acquired hypothyroidism -   -on Levothyroxine.     Acute blood loss anemia on chronic anemia in the setting of 700 ml surgical EBL being treated with Cell Saver transfusion of 350 ml       Disp - DC whn placement is made    Mahesh Chatterjee MD

## 2018-08-17 ENCOUNTER — APPOINTMENT (OUTPATIENT)
Dept: CT IMAGING | Age: 78
DRG: 470 | End: 2018-08-17
Payer: MEDICARE

## 2018-08-17 LAB
ABO/RH: NORMAL
ANION GAP SERPL CALCULATED.3IONS-SCNC: 7 MMOL/L (ref 3–16)
ANTIBODY SCREEN: NORMAL
BASOPHILS ABSOLUTE: 0.1 K/UL (ref 0–0.2)
BASOPHILS RELATIVE PERCENT: 1 %
BLOOD BANK DISPENSE STATUS: NORMAL
BLOOD BANK DISPENSE STATUS: NORMAL
BLOOD BANK PRODUCT CODE: NORMAL
BLOOD BANK PRODUCT CODE: NORMAL
BPU ID: NORMAL
BPU ID: NORMAL
BUN BLDV-MCNC: 14 MG/DL (ref 7–20)
CALCIUM SERPL-MCNC: 7.5 MG/DL (ref 8.3–10.6)
CHLORIDE BLD-SCNC: 102 MMOL/L (ref 99–110)
CO2: 23 MMOL/L (ref 21–32)
CREAT SERPL-MCNC: 0.8 MG/DL (ref 0.6–1.2)
DESCRIPTION BLOOD BANK: NORMAL
DESCRIPTION BLOOD BANK: NORMAL
EOSINOPHILS ABSOLUTE: 0 K/UL (ref 0–0.6)
EOSINOPHILS RELATIVE PERCENT: 0 %
GFR AFRICAN AMERICAN: >60
GFR NON-AFRICAN AMERICAN: >60
GLUCOSE BLD-MCNC: 109 MG/DL (ref 70–99)
HCT VFR BLD CALC: 17.2 % (ref 36–48)
HCT VFR BLD CALC: 17.7 % (ref 36–48)
HCT VFR BLD CALC: 26.9 % (ref 36–48)
HEMOGLOBIN: 5.8 G/DL (ref 12–16)
HEMOGLOBIN: 5.8 G/DL (ref 12–16)
HEMOGLOBIN: 9 G/DL (ref 12–16)
HEMOGLOBIN: 9.2 G/DL (ref 12–16)
LYMPHOCYTES ABSOLUTE: 0.9 K/UL (ref 1–5.1)
LYMPHOCYTES RELATIVE PERCENT: 7 %
MCH RBC QN AUTO: 31.6 PG (ref 26–34)
MCHC RBC AUTO-ENTMCNC: 33.8 G/DL (ref 31–36)
MCV RBC AUTO: 93.5 FL (ref 80–100)
MONOCYTES ABSOLUTE: 1.1 K/UL (ref 0–1.3)
MONOCYTES RELATIVE PERCENT: 9 %
NEUTROPHILS ABSOLUTE: 10.5 K/UL (ref 1.7–7.7)
NEUTROPHILS RELATIVE PERCENT: 83 %
OCCULT BLOOD DIAGNOSTIC: NORMAL
PDW BLD-RTO: 13.7 % (ref 12.4–15.4)
PLATELET # BLD: 118 K/UL (ref 135–450)
PMV BLD AUTO: 7.6 FL (ref 5–10.5)
POTASSIUM REFLEX MAGNESIUM: 4.1 MMOL/L (ref 3.5–5.1)
RBC # BLD: 1.84 M/UL (ref 4–5.2)
SODIUM BLD-SCNC: 132 MMOL/L (ref 136–145)
WBC # BLD: 12.7 K/UL (ref 4–11)

## 2018-08-17 PROCEDURE — 86900 BLOOD TYPING SEROLOGIC ABO: CPT

## 2018-08-17 PROCEDURE — 2580000003 HC RX 258: Performed by: INTERNAL MEDICINE

## 2018-08-17 PROCEDURE — 6370000000 HC RX 637 (ALT 250 FOR IP): Performed by: INTERNAL MEDICINE

## 2018-08-17 PROCEDURE — 36415 COLL VENOUS BLD VENIPUNCTURE: CPT

## 2018-08-17 PROCEDURE — 85014 HEMATOCRIT: CPT

## 2018-08-17 PROCEDURE — 6360000004 HC RX CONTRAST MEDICATION: Performed by: INTERNAL MEDICINE

## 2018-08-17 PROCEDURE — 6370000000 HC RX 637 (ALT 250 FOR IP): Performed by: FAMILY MEDICINE

## 2018-08-17 PROCEDURE — 86923 COMPATIBILITY TEST ELECTRIC: CPT

## 2018-08-17 PROCEDURE — 73701 CT LOWER EXTREMITY W/DYE: CPT

## 2018-08-17 PROCEDURE — P9016 RBC LEUKOCYTES REDUCED: HCPCS

## 2018-08-17 PROCEDURE — 80048 BASIC METABOLIC PNL TOTAL CA: CPT

## 2018-08-17 PROCEDURE — 1200000000 HC SEMI PRIVATE

## 2018-08-17 PROCEDURE — 85018 HEMOGLOBIN: CPT

## 2018-08-17 PROCEDURE — G0328 FECAL BLOOD SCRN IMMUNOASSAY: HCPCS

## 2018-08-17 PROCEDURE — 6360000002 HC RX W HCPCS: Performed by: INTERNAL MEDICINE

## 2018-08-17 PROCEDURE — C9113 INJ PANTOPRAZOLE SODIUM, VIA: HCPCS | Performed by: INTERNAL MEDICINE

## 2018-08-17 PROCEDURE — 85025 COMPLETE CBC W/AUTO DIFF WBC: CPT

## 2018-08-17 PROCEDURE — 6360000002 HC RX W HCPCS: Performed by: ORTHOPAEDIC SURGERY

## 2018-08-17 PROCEDURE — 86850 RBC ANTIBODY SCREEN: CPT

## 2018-08-17 PROCEDURE — 97110 THERAPEUTIC EXERCISES: CPT

## 2018-08-17 PROCEDURE — 86901 BLOOD TYPING SEROLOGIC RH(D): CPT

## 2018-08-17 PROCEDURE — 2580000003 HC RX 258: Performed by: ORTHOPAEDIC SURGERY

## 2018-08-17 PROCEDURE — 93971 EXTREMITY STUDY: CPT

## 2018-08-17 RX ORDER — 0.9 % SODIUM CHLORIDE 0.9 %
250 INTRAVENOUS SOLUTION INTRAVENOUS ONCE
Status: COMPLETED | OUTPATIENT
Start: 2018-08-17 | End: 2018-08-17

## 2018-08-17 RX ORDER — ACETAMINOPHEN 325 MG/1
650 TABLET ORAL EVERY 4 HOURS PRN
Status: DISCONTINUED | OUTPATIENT
Start: 2018-08-17 | End: 2018-08-21 | Stop reason: HOSPADM

## 2018-08-17 RX ORDER — DIPHENHYDRAMINE HCL 25 MG
25 TABLET ORAL EVERY 6 HOURS PRN
Status: DISCONTINUED | OUTPATIENT
Start: 2018-08-17 | End: 2018-08-21 | Stop reason: HOSPADM

## 2018-08-17 RX ORDER — PANTOPRAZOLE SODIUM 40 MG/10ML
40 INJECTION, POWDER, LYOPHILIZED, FOR SOLUTION INTRAVENOUS DAILY
Status: DISCONTINUED | OUTPATIENT
Start: 2018-08-17 | End: 2018-08-21 | Stop reason: HOSPADM

## 2018-08-17 RX ADMIN — OXYCODONE HYDROCHLORIDE AND ACETAMINOPHEN 2 TABLET: 5; 325 TABLET ORAL at 15:04

## 2018-08-17 RX ADMIN — FAMOTIDINE 20 MG: 20 TABLET ORAL at 09:36

## 2018-08-17 RX ADMIN — FLUTICASONE PROPIONATE 2 SPRAY: 50 SPRAY, METERED NASAL at 09:37

## 2018-08-17 RX ADMIN — CARBAMAZEPINE 100 MG: 100 TABLET, EXTENDED RELEASE ORAL at 09:36

## 2018-08-17 RX ADMIN — CETIRIZINE HYDROCHLORIDE 10 MG: 10 TABLET, FILM COATED ORAL at 09:36

## 2018-08-17 RX ADMIN — GABAPENTIN 1200 MG: 600 TABLET, FILM COATED ORAL at 09:36

## 2018-08-17 RX ADMIN — SODIUM CHLORIDE: 9 INJECTION, SOLUTION INTRAVENOUS at 05:57

## 2018-08-17 RX ADMIN — IBUPROFEN 400 MG: 200 TABLET, FILM COATED ORAL at 00:09

## 2018-08-17 RX ADMIN — Medication 10 ML: at 22:04

## 2018-08-17 RX ADMIN — PANTOPRAZOLE SODIUM 40 MG: 40 INJECTION, POWDER, FOR SOLUTION INTRAVENOUS at 10:51

## 2018-08-17 RX ADMIN — IOPAMIDOL 80 ML: 755 INJECTION, SOLUTION INTRAVENOUS at 16:43

## 2018-08-17 RX ADMIN — OXYCODONE HYDROCHLORIDE AND ACETAMINOPHEN 1 TABLET: 5; 325 TABLET ORAL at 05:45

## 2018-08-17 RX ADMIN — LEVOTHYROXINE SODIUM 100 MCG: 100 TABLET ORAL at 05:44

## 2018-08-17 RX ADMIN — OXYCODONE HYDROCHLORIDE AND ACETAMINOPHEN 2 TABLET: 5; 325 TABLET ORAL at 22:03

## 2018-08-17 RX ADMIN — GABAPENTIN 1200 MG: 600 TABLET, FILM COATED ORAL at 15:04

## 2018-08-17 RX ADMIN — SODIUM CHLORIDE 250 ML: 900 INJECTION, SOLUTION INTRAVENOUS at 12:05

## 2018-08-17 RX ADMIN — OXYCODONE HYDROCHLORIDE AND ACETAMINOPHEN 2 TABLET: 5; 325 TABLET ORAL at 10:01

## 2018-08-17 RX ADMIN — DIPHENHYDRAMINE HCL 25 MG: 25 TABLET ORAL at 10:51

## 2018-08-17 RX ADMIN — ENOXAPARIN SODIUM 40 MG: 40 INJECTION SUBCUTANEOUS at 22:03

## 2018-08-17 RX ADMIN — GABAPENTIN 1200 MG: 600 TABLET, FILM COATED ORAL at 22:03

## 2018-08-17 RX ADMIN — IRON SUCROSE 200 MG: 20 INJECTION, SOLUTION INTRAVENOUS at 15:39

## 2018-08-17 RX ADMIN — ENOXAPARIN SODIUM 40 MG: 40 INJECTION SUBCUTANEOUS at 09:36

## 2018-08-17 RX ADMIN — CARBAMAZEPINE 100 MG: 100 TABLET, EXTENDED RELEASE ORAL at 22:04

## 2018-08-17 ASSESSMENT — PAIN DESCRIPTION - ONSET
ONSET: ON-GOING

## 2018-08-17 ASSESSMENT — PAIN SCALES - GENERAL
PAINLEVEL_OUTOF10: 0
PAINLEVEL_OUTOF10: 5
PAINLEVEL_OUTOF10: 3
PAINLEVEL_OUTOF10: 10
PAINLEVEL_OUTOF10: 0
PAINLEVEL_OUTOF10: 8
PAINLEVEL_OUTOF10: 6
PAINLEVEL_OUTOF10: 3
PAINLEVEL_OUTOF10: 10
PAINLEVEL_OUTOF10: 4

## 2018-08-17 ASSESSMENT — PAIN DESCRIPTION - PROGRESSION
CLINICAL_PROGRESSION: GRADUALLY WORSENING
CLINICAL_PROGRESSION: NOT CHANGED

## 2018-08-17 ASSESSMENT — PAIN DESCRIPTION - ORIENTATION
ORIENTATION: LEFT

## 2018-08-17 ASSESSMENT — PAIN DESCRIPTION - DESCRIPTORS
DESCRIPTORS: ACHING
DESCRIPTORS: ACHING;CONSTANT
DESCRIPTORS: ACHING

## 2018-08-17 ASSESSMENT — PAIN DESCRIPTION - PAIN TYPE
TYPE: SURGICAL PAIN
TYPE: ACUTE PAIN;SURGICAL PAIN

## 2018-08-17 ASSESSMENT — PAIN DESCRIPTION - LOCATION
LOCATION: HIP
LOCATION: HIP;LEG
LOCATION: HIP;KNEE
LOCATION: HIP
LOCATION: HIP

## 2018-08-17 ASSESSMENT — PAIN DESCRIPTION - FREQUENCY
FREQUENCY: CONTINUOUS

## 2018-08-17 NOTE — PROGRESS NOTES
Hospitalist Progress Note      PCP: Shea Singh MD    Date of Admission: 8/13/2018    Chief Complaint at admission: L-hip pain    Hospital Course/ Subjective:   78F with HTN, hypothyroidism admitted after a fall with L-closed femur neck secondary to mechanical fall. Orthopedic surgery was consulted and she underwent L- hip hemiarthroplasty with 700cc of reported blood loss with 350cc re-transfused and a net 350cc loss. Patients Hgb went down from 9.1->7.2-> 6.4-> 5.8 with attempted transfusion last night which was not completed due to possible febrile non-hemolytic transfusion reaction. This am, she persistently remains anemic with Hgb 5.8 for which she is getting 2 units today after pre-treatment with tylenol and benadryl which carefully monitoring for transfusion reaction. No other source of bleeding identified. Will check frequent Hgbs today, obtain FOBT and hold Lovenox if Hgb remains low and any source of active bleeding identified. Patient reports L- leg pain with no chest pain, sob, fever, chills.        Medications:  Reviewed    Infusion Medications    sodium chloride 100 mL/hr at 08/17/18 0557     Scheduled Medications    sodium chloride  250 mL Intravenous Once    pantoprazole  40 mg Intravenous Daily    furosemide  20 mg Intravenous See Admin Instructions    iron sucrose  200 mg Intravenous Q24H    sodium chloride flush  10 mL Intravenous 2 times per day    docusate sodium  100 mg Oral BID    enoxaparin  40 mg Subcutaneous BID    0.9 % sodium chloride  250 mL Intravenous Once    cetirizine  10 mg Oral Daily    fluticasone  2 spray Nasal Daily    sodium chloride flush  10 mL Intravenous 2 times per day    levothyroxine  100 mcg Oral Daily    gabapentin  1,200 mg Oral TID    carBAMazepine  100 mg Oral BID     PRN Meds: diphenhydrAMINE, acetaminophen, ibuprofen, prochlorperazine, hydrALAZINE, sodium chloride flush, LORazepam, morphine, oxyCODONE-acetaminophen **OR**

## 2018-08-17 NOTE — CARE COORDINATION
CM let Emmanuelle Snyder with [facility] know that patient is not medically ready for d/c as of today 8/17/18 possible d/c tomorrow 8/18/18. Pedro's ([facility]) cell number for on call this weekend is [phone]. If patient is ready for d/c this weekend call report to    [phone] ask for nursing MDS dept. Fax DORIAN/AVS to [phone]. CM on for Saturday can set up transport if patient medically cleared to go to SNF.   Electronically signed by Tamar Osuna, RUBÉN, BRIANW on 8/17/2018 at 12:18 PM

## 2018-08-17 NOTE — PLAN OF CARE
Problem: Falls - Risk of:  Goal: Will remain free from falls  Will remain free from falls   Outcome: Ongoing  Patient is a high fall risk. All fall precautions in place: bed alarm on, nonskid socks on pt, call light within reach, bed locked in lowest position. Pt has made no attempt to get OOB unassisted. Pt ambulates with assistance of 2 and walker or gume stedy. Will continue to monitor pt safety.

## 2018-08-17 NOTE — PROGRESS NOTES
Physical Therapy  Daily Treatment Note    Discharge Recommendations: Sebas Unger scored a 8/24 on the AM-PAC short mobility form. Current research shows that an AM-PAC score of 17 or less is typically not associated with a discharge to the patient's home setting. Based on the patients AM-PAC score and their current functional mobility deficits, it is recommended that the patient have 3-5 sessions per week of Physical Therapy at d/c to increase the patients independence. Equipment Needs: Defer    Chart Reviewed: Yes     Other position/activity restrictions: Up with assist; Full WB; Anterior Hip precautions: No hip adduction beyond neutral, no external rotation and no hyperextension   Additional Pertinent Hx: Pt admitted 8/13 s/p fall while walking her dog, resulting in (+) Displaced transcervical left femoral neck fracture. Pt to OR 8/14 s/p left hip hemiarthroplasty. PMH:  OA, DDD, HTN, trigeminal nerve diease, back surgery, L knee TKR    Diagnosis: L femoral neck fx   Treatment Diagnosis: Decreased gait associated with L hip fx. Pt with critically low HgB (5.8) today. Plan is for blood transfusion per RN. Subjective: Pt in bed. Agreeable to LE exercises in bed. Pain: Sore L hip. RN aware. Objective:    Exercises  10 reps B ankle pumps, quad sets, glut sets with cues  10 reps L hip abd/add (mod assist), heel slides (min assist), SAQ (min assist) in supine. Patient Education  Role of PT. Expressed understanding. Safety Devices  Pt left with needs in reach. In bed with bed alarm on. RN present and aware. Assessment:  Pt with decreased activity tolerance today due to low HgB and needing blood transfusion. Good participation with exercises. Pt continues to be below baseline for mobility and would benefit from skilled PT to maximize independence.      AM-PAC score  AM-PAC Inpatient Mobility Raw Score : 8  AM-PAC Inpatient T-Scale Score : 28.52  Mobility Inpatient CMS 0-100% Score:

## 2018-08-18 LAB
ANION GAP SERPL CALCULATED.3IONS-SCNC: 9 MMOL/L (ref 3–16)
ANISOCYTOSIS: ABNORMAL
BASOPHILS ABSOLUTE: 0 K/UL (ref 0–0.2)
BASOPHILS RELATIVE PERCENT: 0 %
BUN BLDV-MCNC: 9 MG/DL (ref 7–20)
CALCIUM SERPL-MCNC: 8.1 MG/DL (ref 8.3–10.6)
CHLORIDE BLD-SCNC: 99 MMOL/L (ref 99–110)
CO2: 23 MMOL/L (ref 21–32)
CREAT SERPL-MCNC: 0.7 MG/DL (ref 0.6–1.2)
EOSINOPHILS ABSOLUTE: 0.7 K/UL (ref 0–0.6)
EOSINOPHILS RELATIVE PERCENT: 5 %
GFR AFRICAN AMERICAN: >60
GFR NON-AFRICAN AMERICAN: >60
GLUCOSE BLD-MCNC: 100 MG/DL (ref 70–99)
HCT VFR BLD CALC: 23.1 % (ref 36–48)
HCT VFR BLD CALC: 23.4 % (ref 36–48)
HCT VFR BLD CALC: 23.9 % (ref 36–48)
HCT VFR BLD CALC: 25.6 % (ref 36–48)
HEMOGLOBIN: 7.8 G/DL (ref 12–16)
HEMOGLOBIN: 7.9 G/DL (ref 12–16)
HEMOGLOBIN: 8 G/DL (ref 12–16)
HEMOGLOBIN: 8.6 G/DL (ref 12–16)
LYMPHOCYTES ABSOLUTE: 1.5 K/UL (ref 1–5.1)
LYMPHOCYTES RELATIVE PERCENT: 11 %
MAGNESIUM: 1.5 MG/DL (ref 1.8–2.4)
MCH RBC QN AUTO: 30.2 PG (ref 26–34)
MCHC RBC AUTO-ENTMCNC: 33.5 G/DL (ref 31–36)
MCV RBC AUTO: 90.2 FL (ref 80–100)
MONOCYTES ABSOLUTE: 0.5 K/UL (ref 0–1.3)
MONOCYTES RELATIVE PERCENT: 4 %
NEUTROPHILS ABSOLUTE: 10.9 K/UL (ref 1.7–7.7)
NEUTROPHILS RELATIVE PERCENT: 80 %
PDW BLD-RTO: 15.4 % (ref 12.4–15.4)
PLATELET # BLD: 145 K/UL (ref 135–450)
PMV BLD AUTO: 7.5 FL (ref 5–10.5)
POLYCHROMASIA: ABNORMAL
POTASSIUM REFLEX MAGNESIUM: 3.6 MMOL/L (ref 3.5–5.1)
RBC # BLD: 2.83 M/UL (ref 4–5.2)
SODIUM BLD-SCNC: 131 MMOL/L (ref 136–145)
TEAR DROP CELLS: ABNORMAL
WBC # BLD: 13.6 K/UL (ref 4–11)

## 2018-08-18 PROCEDURE — 97116 GAIT TRAINING THERAPY: CPT

## 2018-08-18 PROCEDURE — 6360000002 HC RX W HCPCS: Performed by: INTERNAL MEDICINE

## 2018-08-18 PROCEDURE — 1200000000 HC SEMI PRIVATE

## 2018-08-18 PROCEDURE — 80048 BASIC METABOLIC PNL TOTAL CA: CPT

## 2018-08-18 PROCEDURE — 6360000002 HC RX W HCPCS: Performed by: ORTHOPAEDIC SURGERY

## 2018-08-18 PROCEDURE — 6370000000 HC RX 637 (ALT 250 FOR IP): Performed by: INTERNAL MEDICINE

## 2018-08-18 PROCEDURE — 83735 ASSAY OF MAGNESIUM: CPT

## 2018-08-18 PROCEDURE — 85014 HEMATOCRIT: CPT

## 2018-08-18 PROCEDURE — 97535 SELF CARE MNGMENT TRAINING: CPT

## 2018-08-18 PROCEDURE — 97110 THERAPEUTIC EXERCISES: CPT

## 2018-08-18 PROCEDURE — 85025 COMPLETE CBC W/AUTO DIFF WBC: CPT

## 2018-08-18 PROCEDURE — 2580000003 HC RX 258: Performed by: INTERNAL MEDICINE

## 2018-08-18 PROCEDURE — C9113 INJ PANTOPRAZOLE SODIUM, VIA: HCPCS | Performed by: INTERNAL MEDICINE

## 2018-08-18 PROCEDURE — 85018 HEMOGLOBIN: CPT

## 2018-08-18 PROCEDURE — 36415 COLL VENOUS BLD VENIPUNCTURE: CPT

## 2018-08-18 PROCEDURE — 97530 THERAPEUTIC ACTIVITIES: CPT

## 2018-08-18 PROCEDURE — 6370000000 HC RX 637 (ALT 250 FOR IP): Performed by: ORTHOPAEDIC SURGERY

## 2018-08-18 PROCEDURE — 2580000003 HC RX 258: Performed by: ORTHOPAEDIC SURGERY

## 2018-08-18 RX ORDER — POTASSIUM CHLORIDE 20 MEQ/1
40 TABLET, EXTENDED RELEASE ORAL ONCE
Status: COMPLETED | OUTPATIENT
Start: 2018-08-18 | End: 2018-08-18

## 2018-08-18 RX ORDER — MAGNESIUM SULFATE IN WATER 40 MG/ML
2 INJECTION, SOLUTION INTRAVENOUS ONCE
Status: COMPLETED | OUTPATIENT
Start: 2018-08-18 | End: 2018-08-18

## 2018-08-18 RX ADMIN — Medication 10 ML: at 23:25

## 2018-08-18 RX ADMIN — Medication 10 ML: at 09:41

## 2018-08-18 RX ADMIN — GABAPENTIN 1200 MG: 600 TABLET, FILM COATED ORAL at 09:36

## 2018-08-18 RX ADMIN — PANTOPRAZOLE SODIUM 40 MG: 40 INJECTION, POWDER, FOR SOLUTION INTRAVENOUS at 09:36

## 2018-08-18 RX ADMIN — OXYCODONE HYDROCHLORIDE AND ACETAMINOPHEN 2 TABLET: 5; 325 TABLET ORAL at 16:32

## 2018-08-18 RX ADMIN — ENOXAPARIN SODIUM 40 MG: 40 INJECTION SUBCUTANEOUS at 23:24

## 2018-08-18 RX ADMIN — OXYCODONE HYDROCHLORIDE AND ACETAMINOPHEN 2 TABLET: 5; 325 TABLET ORAL at 09:36

## 2018-08-18 RX ADMIN — PROCHLORPERAZINE EDISYLATE 10 MG: 5 INJECTION INTRAMUSCULAR; INTRAVENOUS at 09:29

## 2018-08-18 RX ADMIN — Medication 10 ML: at 23:24

## 2018-08-18 RX ADMIN — CARBAMAZEPINE 100 MG: 100 TABLET, EXTENDED RELEASE ORAL at 10:55

## 2018-08-18 RX ADMIN — MAGNESIUM SULFATE IN WATER 2 G: 40 INJECTION, SOLUTION INTRAVENOUS at 17:00

## 2018-08-18 RX ADMIN — CETIRIZINE HYDROCHLORIDE 10 MG: 10 TABLET, FILM COATED ORAL at 09:36

## 2018-08-18 RX ADMIN — GABAPENTIN 1200 MG: 600 TABLET, FILM COATED ORAL at 13:33

## 2018-08-18 RX ADMIN — DOCUSATE SODIUM 100 MG: 100 CAPSULE, LIQUID FILLED ORAL at 09:36

## 2018-08-18 RX ADMIN — POTASSIUM CHLORIDE 40 MEQ: 20 TABLET, EXTENDED RELEASE ORAL at 10:55

## 2018-08-18 RX ADMIN — CARBAMAZEPINE 100 MG: 100 TABLET, EXTENDED RELEASE ORAL at 23:24

## 2018-08-18 RX ADMIN — Medication 10 ML: at 09:30

## 2018-08-18 RX ADMIN — LEVOTHYROXINE SODIUM 100 MCG: 100 TABLET ORAL at 07:00

## 2018-08-18 RX ADMIN — ENOXAPARIN SODIUM 40 MG: 40 INJECTION SUBCUTANEOUS at 09:37

## 2018-08-18 RX ADMIN — GABAPENTIN 1200 MG: 600 TABLET, FILM COATED ORAL at 23:24

## 2018-08-18 RX ADMIN — FLUTICASONE PROPIONATE 2 SPRAY: 50 SPRAY, METERED NASAL at 09:37

## 2018-08-18 ASSESSMENT — PAIN DESCRIPTION - ORIENTATION
ORIENTATION: LEFT
ORIENTATION: LEFT

## 2018-08-18 ASSESSMENT — PAIN SCALES - GENERAL
PAINLEVEL_OUTOF10: 9
PAINLEVEL_OUTOF10: 0
PAINLEVEL_OUTOF10: 10
PAINLEVEL_OUTOF10: 0
PAINLEVEL_OUTOF10: 4
PAINLEVEL_OUTOF10: 7

## 2018-08-18 ASSESSMENT — PAIN DESCRIPTION - DESCRIPTORS
DESCRIPTORS: ACHING
DESCRIPTORS: ACHING

## 2018-08-18 ASSESSMENT — PAIN DESCRIPTION - FREQUENCY
FREQUENCY: CONTINUOUS
FREQUENCY: CONTINUOUS

## 2018-08-18 ASSESSMENT — PAIN DESCRIPTION - LOCATION
LOCATION: HIP;LEG
LOCATION: HIP;LEG

## 2018-08-18 ASSESSMENT — PAIN DESCRIPTION - ONSET
ONSET: ON-GOING
ONSET: ON-GOING

## 2018-08-18 ASSESSMENT — PAIN DESCRIPTION - PROGRESSION
CLINICAL_PROGRESSION: NOT CHANGED
CLINICAL_PROGRESSION: GRADUALLY IMPROVING

## 2018-08-18 ASSESSMENT — PAIN DESCRIPTION - PAIN TYPE
TYPE: SURGICAL PAIN
TYPE: SURGICAL PAIN

## 2018-08-18 NOTE — PROGRESS NOTES
Assisted the pt up to the bathroom with walker and gait belt, pt voided urine but the urine missed the hat, so unable to send UA at this time, will try again later. The pt also had loose stool again, per hospital protocol will need to hold off on all stool softeners, per pt she has been having diarrhea on and off since before she came into the hospital, which wasn't reported to me on hand off, but the pt has had 2 episodes of diarrhea for me today, will hold all stool softeners and pass this information on to the next shift.  Dayna Fuentes

## 2018-08-18 NOTE — PROGRESS NOTES
Physical Therapy  Daily Treatment Note    Discharge Recommendations: Juan Pablo Gonzalez scored a 14/24 on the AM-PAC short mobility form. Current research shows that an AM-PAC score of 17 or less is typically not associated with a discharge to the patient's home setting. Based on the patients AM-PAC score and their current functional mobility deficits, it is recommended that the patient have 3-5 sessions per week of Physical Therapy at d/c to increase the patients independence. Equipment Needs: Defer    Chart Reviewed: Yes     Other position/activity restrictions: Up with assist; Full WB; Anterior Hip precautions: No hip adduction beyond neutral, no external rotation and no hyperextension   Additional Pertinent Hx: Pt admitted 8/13 s/p fall while walking her dog, resulting in (+) Displaced transcervical left femoral neck fracture. Pt to OR 8/14 s/p left hip hemiarthroplasty. PMH:  OA, DDD, HTN, trigeminal nerve diease, back surgery, L knee TKR    Diagnosis: L femoral neck fx   Treatment Diagnosis: Decreased gait associated with L hip fx. Subjective: Pt in chair initially. \"I've been sitting up for a couple of hours. \" Ready to work with PT. Asking to get back to bed afterwards. States she was able to walk to the bathroom with the RN earlier today. \"    Pain: 4/10 L hip after PT session. Ice packs to hip. Objective:    Bed mobility  Sit to Supine: Max assist for LEs    Transfers  Sit to stand: Min assist x 1 from chair; Mod assist x 1 from commode with grab bar; Mod assist from armless chair. Stand to sit: Min assist for controlled descent  Other: Cues for hand placement and L LE placement with transfers. Ambulation  Assistance Level: Min assist to CGA  Assistive device: Wheeled walker  Distance: 20 ft, 4 ft, 6 ft. Seated rest between walks  Quality of gait: Decreased step length; narrow MARIA GUADALUPE; Step-to pattern; Mild limp R LE; Flexed posture.     Exercises  10 reps B ankle pumps, quad sets, glut sets, L

## 2018-08-18 NOTE — PROGRESS NOTES
Assessment complete, see flow sheet. Assisted pt up to the bathroom and back to bed with two assist a walker and a gait belt, pt was slow moving but tolerated fairly well. Pt c/o nausea after going to the bathroom and back and of pain, medicated pt with ordered medications see CITLALLI Rodriguez

## 2018-08-18 NOTE — PROGRESS NOTES
Occupational Therapy  Facility/Department: [facility] 5T ORTHO/NEURO  Daily Treatment Note  NAME: May Mustafa  : 1940  MRN: [id number]    Date of Service: 2018    Discharge Recommendations:  May Mustafa scored a 16/24 on the AM-PAC ADL Inpatient form. Current research shows that an AM-PAC score of 17 or less is typically not associated with a discharge to the patient's home setting. Based on the patients AM-PAC score and their current ADL deficits, it is recommended that the patient have 3-5 sessions per week of Occupational Therapy at d/c to increase the patients independence. OT Equipment Recommendations  Equipment Needed: No  Other: defer recommendations to next level of care    Patient Diagnosis(es): The encounter diagnosis was Closed fracture of left hip, initial encounter ([facility] [address] has a past medical history of Arthritis; Closed fracture of neck of left femur ([facility] [address] Degenerative disc disease; Hypertension; Thyroid disease; and Trigeminal nerve disease or syndrome. has a past surgical history that includes sinus surgery; shoulder surgery; joint replacement; other surgical history; laminectomy (1/3/11); back surgery; Tonsillectomy; and pr partial hip replacement (Left, 2018). Restrictions  Position Activity Restriction  Other position/activity restrictions: Up with assist; Full WB; Anterior Hip precautions: No hip adduction beyond neutral, no external rotation and no hyperextension  Subjective   General  Chart Reviewed: Yes  Additional Pertinent Hx: PMH:  B TKR, DJD, trigeminal nerve dz, thyroid dz, back surgery, R rotator cuff surgery  Family / Caregiver Present: No  Diagnosis: Pt admitted after falling and sustaining L femoral neck fx, s/p L hemiarthroplasty  Subjective  Subjective: pt sitting in chair, pt is sleepy but agreeable to therapy. States sitting in chair 2+ hr today.   Pain Assessment  Patient Currently in Pain: Denies    Orientation  Orientation  Overall Orientation

## 2018-08-18 NOTE — OP NOTE
At this point, I placed the cable clamp and tightened the cable  for fixation. I made note that I did not over tension and did not move. I  then crimped the cerclage cable clamp and then cut the cables, took the hip  through a range of motion. The medial calcar fracture was stable. I  placed one broach into the femur to make sure that it did not separate any  further. At this point, I did scrub out of the operating room and Dr. Sundar Mancilla proceeded with cementation of the hemiarthroplasty. Please see Dr. Middleton Dry report for full note.         Irma Newman MD    D: 08/17/2018 11:17:53       T: 08/17/2018 11:19:14     [email or web address]  Job#: [id number]     Doc#: [id number]    CC:

## 2018-08-18 NOTE — PROGRESS NOTES
Hospitalist Progress Note      PCP: Glory Almanza MD    Date of Admission: 8/13/2018    Chief Complaint at admission: L-hip pain    Hospital Course/ Subjective:   78F with HTN, hypothyroidism admitted after a fall with L-closed femur neck secondary to mechanical fall. Orthopedic surgery was consulted and she underwent L- hip hemiarthroplasty with some blood loss. Patients Hgb went down from 9.1->7.2-> 6.4-> 5.8 yesterday after which she got transfused 2 units and a CT L-hip was obtained that showed small focal hematoma and postsurgical changes which are not concerning for collection/abscess per ortho. Hgb remains stable this am at 8.0. Patient still reports L- thigh pain/tenderness with no chest pain, sob, fever, chills.        Medications:  Reviewed    Infusion Medications     Scheduled Medications    pantoprazole  40 mg Intravenous Daily    furosemide  20 mg Intravenous See Admin Instructions    sodium chloride flush  10 mL Intravenous 2 times per day    docusate sodium  100 mg Oral BID    enoxaparin  40 mg Subcutaneous BID    0.9 % sodium chloride  250 mL Intravenous Once    cetirizine  10 mg Oral Daily    fluticasone  2 spray Nasal Daily    sodium chloride flush  10 mL Intravenous 2 times per day    levothyroxine  100 mcg Oral Daily    gabapentin  1,200 mg Oral TID    carBAMazepine  100 mg Oral BID     PRN Meds: diphenhydrAMINE, acetaminophen, ibuprofen, prochlorperazine, hydrALAZINE, sodium chloride flush, LORazepam, morphine, oxyCODONE-acetaminophen **OR** oxyCODONE-acetaminophen, sodium chloride flush, magnesium hydroxide, ondansetron, nicotine      Intake/Output Summary (Last 24 hours) at 08/18/18 1624  Last data filed at 08/18/18 1300   Gross per 24 hour   Intake              810 ml   Output              400 ml   Net              410 ml       Physical Exam Performed:    BP (!) 109/58   Pulse 87   Temp 99.1 °F (37.3 °C) (Oral)   Resp 16   Ht 5' 2.01\" (1.575 m)   Wt 128 lb

## 2018-08-19 ENCOUNTER — APPOINTMENT (OUTPATIENT)
Dept: GENERAL RADIOLOGY | Age: 78
DRG: 470 | End: 2018-08-19
Payer: MEDICARE

## 2018-08-19 LAB
ANION GAP SERPL CALCULATED.3IONS-SCNC: 8 MMOL/L (ref 3–16)
BACTERIA: ABNORMAL /HPF
BASOPHILS ABSOLUTE: 0 K/UL (ref 0–0.2)
BASOPHILS RELATIVE PERCENT: 0 %
BILIRUBIN URINE: NEGATIVE
BLOOD, URINE: ABNORMAL
BUN BLDV-MCNC: 8 MG/DL (ref 7–20)
CALCIUM SERPL-MCNC: 7.9 MG/DL (ref 8.3–10.6)
CHLORIDE BLD-SCNC: 101 MMOL/L (ref 99–110)
CLARITY: CLEAR
CO2: 22 MMOL/L (ref 21–32)
COLOR: YELLOW
CREAT SERPL-MCNC: 0.6 MG/DL (ref 0.6–1.2)
EOSINOPHILS ABSOLUTE: 0.5 K/UL (ref 0–0.6)
EOSINOPHILS RELATIVE PERCENT: 5 %
EPITHELIAL CELLS, UA: ABNORMAL /HPF
FERRITIN: 1818 NG/ML (ref 15–150)
GFR AFRICAN AMERICAN: >60
GFR NON-AFRICAN AMERICAN: >60
GLUCOSE BLD-MCNC: 106 MG/DL (ref 70–99)
GLUCOSE URINE: NEGATIVE MG/DL
HCT VFR BLD CALC: 21.6 % (ref 36–48)
HCT VFR BLD CALC: 23 % (ref 36–48)
HCT VFR BLD CALC: 23.2 % (ref 36–48)
HEMOGLOBIN: 7.4 G/DL (ref 12–16)
HEMOGLOBIN: 7.6 G/DL (ref 12–16)
HEMOGLOBIN: 7.8 G/DL (ref 12–16)
HYPOCHROMIA: ABNORMAL
KETONES, URINE: NEGATIVE MG/DL
LEUKOCYTE ESTERASE, URINE: NEGATIVE
LYMPHOCYTES ABSOLUTE: 1.6 K/UL (ref 1–5.1)
LYMPHOCYTES RELATIVE PERCENT: 15 %
MCH RBC QN AUTO: 30.9 PG (ref 26–34)
MCHC RBC AUTO-ENTMCNC: 34.2 G/DL (ref 31–36)
MCV RBC AUTO: 90.4 FL (ref 80–100)
MICROSCOPIC EXAMINATION: YES
MONOCYTES ABSOLUTE: 1.1 K/UL (ref 0–1.3)
MONOCYTES RELATIVE PERCENT: 10 %
NEUTROPHILS ABSOLUTE: 7.5 K/UL (ref 1.7–7.7)
NEUTROPHILS RELATIVE PERCENT: 70 %
NITRITE, URINE: NEGATIVE
OCCULT BLOOD DIAGNOSTIC: NORMAL
PDW BLD-RTO: 15.2 % (ref 12.4–15.4)
PH UA: 6.5
PLATELET # BLD: 160 K/UL (ref 135–450)
PMV BLD AUTO: 7.2 FL (ref 5–10.5)
POTASSIUM REFLEX MAGNESIUM: 4 MMOL/L (ref 3.5–5.1)
PROTEIN UA: NEGATIVE MG/DL
RBC # BLD: 2.39 M/UL (ref 4–5.2)
RBC UA: ABNORMAL /HPF (ref 0–2)
SODIUM BLD-SCNC: 131 MMOL/L (ref 136–145)
SPECIFIC GRAVITY UA: 1.01
URINE TYPE: ABNORMAL
UROBILINOGEN, URINE: 0.2 E.U./DL
WBC # BLD: 10.7 K/UL (ref 4–11)
WBC UA: ABNORMAL /HPF (ref 0–5)

## 2018-08-19 PROCEDURE — 85018 HEMOGLOBIN: CPT

## 2018-08-19 PROCEDURE — 6360000002 HC RX W HCPCS: Performed by: INTERNAL MEDICINE

## 2018-08-19 PROCEDURE — 97530 THERAPEUTIC ACTIVITIES: CPT

## 2018-08-19 PROCEDURE — 2580000003 HC RX 258: Performed by: ORTHOPAEDIC SURGERY

## 2018-08-19 PROCEDURE — 82728 ASSAY OF FERRITIN: CPT

## 2018-08-19 PROCEDURE — G0328 FECAL BLOOD SCRN IMMUNOASSAY: HCPCS

## 2018-08-19 PROCEDURE — 36415 COLL VENOUS BLD VENIPUNCTURE: CPT

## 2018-08-19 PROCEDURE — 97116 GAIT TRAINING THERAPY: CPT

## 2018-08-19 PROCEDURE — 85014 HEMATOCRIT: CPT

## 2018-08-19 PROCEDURE — 6370000000 HC RX 637 (ALT 250 FOR IP): Performed by: INTERNAL MEDICINE

## 2018-08-19 PROCEDURE — 80048 BASIC METABOLIC PNL TOTAL CA: CPT

## 2018-08-19 PROCEDURE — 71045 X-RAY EXAM CHEST 1 VIEW: CPT

## 2018-08-19 PROCEDURE — 6360000002 HC RX W HCPCS: Performed by: ORTHOPAEDIC SURGERY

## 2018-08-19 PROCEDURE — 2580000003 HC RX 258: Performed by: INTERNAL MEDICINE

## 2018-08-19 PROCEDURE — 81001 URINALYSIS AUTO W/SCOPE: CPT

## 2018-08-19 PROCEDURE — 85025 COMPLETE CBC W/AUTO DIFF WBC: CPT

## 2018-08-19 PROCEDURE — 1200000000 HC SEMI PRIVATE

## 2018-08-19 PROCEDURE — C9113 INJ PANTOPRAZOLE SODIUM, VIA: HCPCS | Performed by: INTERNAL MEDICINE

## 2018-08-19 RX ADMIN — ENOXAPARIN SODIUM 40 MG: 40 INJECTION SUBCUTANEOUS at 10:26

## 2018-08-19 RX ADMIN — Medication 10 ML: at 21:21

## 2018-08-19 RX ADMIN — LEVOTHYROXINE SODIUM 100 MCG: 100 TABLET ORAL at 07:07

## 2018-08-19 RX ADMIN — CARBAMAZEPINE 100 MG: 100 TABLET, EXTENDED RELEASE ORAL at 10:27

## 2018-08-19 RX ADMIN — CARBAMAZEPINE 100 MG: 100 TABLET, EXTENDED RELEASE ORAL at 21:20

## 2018-08-19 RX ADMIN — ENOXAPARIN SODIUM 40 MG: 40 INJECTION SUBCUTANEOUS at 21:20

## 2018-08-19 RX ADMIN — Medication 10 ML: at 10:29

## 2018-08-19 RX ADMIN — Medication 10 ML: at 10:26

## 2018-08-19 RX ADMIN — GABAPENTIN 1200 MG: 600 TABLET, FILM COATED ORAL at 15:43

## 2018-08-19 RX ADMIN — GABAPENTIN 1200 MG: 600 TABLET, FILM COATED ORAL at 10:27

## 2018-08-19 RX ADMIN — CETIRIZINE HYDROCHLORIDE 10 MG: 10 TABLET, FILM COATED ORAL at 10:27

## 2018-08-19 RX ADMIN — FLUTICASONE PROPIONATE 2 SPRAY: 50 SPRAY, METERED NASAL at 10:34

## 2018-08-19 RX ADMIN — PANTOPRAZOLE SODIUM 40 MG: 40 INJECTION, POWDER, FOR SOLUTION INTRAVENOUS at 10:26

## 2018-08-19 RX ADMIN — GABAPENTIN 1200 MG: 600 TABLET, FILM COATED ORAL at 21:20

## 2018-08-19 RX ADMIN — OXYCODONE HYDROCHLORIDE AND ACETAMINOPHEN 2 TABLET: 5; 325 TABLET ORAL at 10:28

## 2018-08-19 RX ADMIN — OXYCODONE HYDROCHLORIDE AND ACETAMINOPHEN 2 TABLET: 5; 325 TABLET ORAL at 02:06

## 2018-08-19 ASSESSMENT — PAIN DESCRIPTION - PROGRESSION
CLINICAL_PROGRESSION: NOT CHANGED
CLINICAL_PROGRESSION: NOT CHANGED

## 2018-08-19 ASSESSMENT — PAIN DESCRIPTION - DESCRIPTORS
DESCRIPTORS: ACHING;CONSTANT
DESCRIPTORS: ACHING

## 2018-08-19 ASSESSMENT — PAIN SCALES - GENERAL
PAINLEVEL_OUTOF10: 7
PAINLEVEL_OUTOF10: 0
PAINLEVEL_OUTOF10: 0
PAINLEVEL_OUTOF10: 9

## 2018-08-19 ASSESSMENT — PAIN DESCRIPTION - FREQUENCY
FREQUENCY: CONTINUOUS
FREQUENCY: CONTINUOUS

## 2018-08-19 ASSESSMENT — PAIN DESCRIPTION - LOCATION
LOCATION: HIP
LOCATION: HIP

## 2018-08-19 ASSESSMENT — PAIN DESCRIPTION - ONSET
ONSET: ON-GOING
ONSET: ON-GOING

## 2018-08-19 ASSESSMENT — PAIN DESCRIPTION - ORIENTATION
ORIENTATION: LEFT
ORIENTATION: LEFT

## 2018-08-19 ASSESSMENT — PAIN DESCRIPTION - PAIN TYPE
TYPE: SURGICAL PAIN
TYPE: ACUTE PAIN;SURGICAL PAIN

## 2018-08-19 NOTE — PROGRESS NOTES
shadowing (changed to aquacel ag and ABD. Thigh is swollen, yet soft and non-tender. 5+ DF/PF strength. 2+ palp PT/DP pulses. Calf soft and non-tender with negative homans. NVI. ASSESSMENT:   POD # 5 s/p left Hip hemiarthroplasty    PLAN:    Analgesia: Pain control with combination of oral analgesics PRN. Medications: Zofran for nausea   Dressings: Please change PRN with Aquacel Ag and 4x4/Abds, ice packs per RN   DVT Prophylaxis: Lovenox/Mechanical, will need 30 days of coverage. PT: Adrianna Dancer will participate in physical therapy today with emphasis on ambulation. Disposition: Dispo when cleared by PT, and further medically stable. As per prior note, if medically stable tomorrow, she is ok for dispo from ortho perspective. Follow up in 10 days. Dressing changes PRN. Keep covered/dry. Ok to shower with covered. Resume home diet (but high protein low carbs), could benefit from ferrous sulfate supplementation at home. Will need chemical dvt ppx for 30 days. WBAT with walker and assistance. Call clinic with questions or concerns. Denise Garsia IV, D.O.    Clinical Fellow, [facility]  8/19/2018

## 2018-08-19 NOTE — PROGRESS NOTES
Orientation  Orientation  Overall Orientation Status: Within Functional Limits  Objective   Bed mobility  Supine to Sit: Moderate assistance (HOB elevated)  Comment: Significant time to complete task, increased effort, patient required 1 seated rest break  Transfers  Sit to Stand: Minimal Assistance (from bed and chair)  Stand to sit: Minimal Assistance (to chair and bed)  Comment: verbal cues for hand placement  Ambulation  Ambulation?: Yes  Ambulation 1  Device: Rolling Walker  Assistance: Contact guard assistance  Quality of Gait: step-to gait leading with LLE, effortful, decreased cleve, no overt LOB   Distance: 13' + 20' + 30'  Comments: Patient required 2 seated rest breaks. Stairs/Curb  Stairs?: No     Balance  Sitting - Static: Good  Sitting - Dynamic: Fair  Standing - Static: Fair  Standing - Dynamic: Fair                           Assessment   Body structures, Functions, Activity limitations: Decreased functional mobility   Assessment: Patient making good progress toward all goals, demonstrating improved mobility this date. Mervin for transfers, [place] for bed mobility, and CGA for ambulation. Patient would benefit from continued skilled therapy to return to PLOF. Plans to discharge to SNF. Will continue to follow inpatient. Treatment Diagnosis: Decreased gait associated with L hip fx. Patient Education: Role of PT, WB status, hip precautions and d/c recommendations. Pt verbalized partial understanding and would benefit from ongoing education.    REQUIRES PT FOLLOW UP: Yes  Activity Tolerance  Activity Tolerance: Patient Tolerated treatment well     AM-PAC Score  AM-PAC Inpatient Mobility Raw Score : 16  AM-PAC Inpatient T-Scale Score : 40.78  Mobility Inpatient CMS 0-100% Score: 54.16  Mobility Inpatient CMS G-Code Modifier : CK          Goals  Short term goals  Time Frame for Short term goals: Discharge  Short term goal 1: bed mobility mod A MET 8/19 UPDATE supine<>sit with CGA  Short term goal 2: sit <> stand mod A  MET 8/19 UPDATE sit<>Stand with CGA  Short term goal 3: ambulate 25ft with rolling walker mod A  MET 8/19 UPDATE ambulate 100' with LRAD and CGA  Patient Goals   Patient goals : Return home    Plan    Plan  Times per week: 7  Times per day: Daily  Current Treatment Recommendations: Transfer Training, Gait Training, Strengthening  Safety Devices  Type of devices: Left in chair, Chair alarm in place, Call light within reach, Nurse notified, Gait belt     Therapy Time   Individual Concurrent Group Co-treatment   Time In 1049         Time Out 1128         Minutes 39               Timed Code Treatment Minutes: 39  Total Treatment Minutes:  39    This note will serve as a discharge summary if patient is discharged prior to next treatment session.   Corrine Talbert PT, DPT, [id number]

## 2018-08-19 NOTE — PROGRESS NOTES
Patient alert and oriented x4. Vitals stable. Pain controlled with oral medication. Dressing to surgical site saturated overnight. Replaced dressing with 3 ABD pads and medipore tape. toerlating ambulation well to restroom x1 with walker and gait belt. Will continue to monitor.

## 2018-08-19 NOTE — PROGRESS NOTES
Functional Limits  Objective    ADL  LE Dressing: Maximum assistance (forward reaching x 3 w/ reach just below midcalf area (unable to reach feet))        Balance  Sitting Balance: Stand by assistance  Standing Balance:  (CG/Min A)  Standing Balance/Tolerance for Standing Activity  Time: 2 1/2 minutes; 4 minutes; 4 1/2 minutes  Activity: standing and short distance functional mobility w/ chair follow  Comment: Note:  fatigued; did require seated rest breaks prn  Functional Mobility  Functional - Mobility Device: Rolling Walker  Activity: Other  Assist Level: Contact guard assistance  Functional Mobility Comments: initial cuing for correct step sequencing and to increase upright standing posture, pt forward flexed 2* chronic back pain  Bed mobility  Supine to Sit: Moderate assistance (HOB elevated, increased time/effort; cues for precautions; initial assist w/ LEs then trunk)  Transfers  Sit to stand: Minimal assistance  Stand to sit: Minimal assistance                       Cognition  Overall Cognitive Status: WFL                                         Assessment   Performance deficits / Impairments: Decreased functional mobility ; Decreased ADL status; Decreased ROM  Assessment: Pt continues to require assist w/ transfers and ADLs and fatigues on exertion. Good effort. Plans for SNF at discharge and pt will benefit from ongoing OT. Continue per POC. Treatment Diagnosis: impaired ADLs and transfers secondary to L hip fx, s/p THR  Prognosis: Good  Patient Education: ADLs, OT role, activity promotion, safe transfers- verb understanding  REQUIRES OT FOLLOW UP: Yes  Activity Tolerance  Activity Tolerance: Patient limited by fatigue  Activity Tolerance: requiring seated rest breaks prn; tolerating 2 1/2 - 4 minutes of standing before needing a seated rest break  Safety Devices  Safety Devices in place: Yes  Type of devices: Call light within reach;Nurse notified; Left in chair;Chair alarm in place          Plan  This note will serve as a discharge summary in the event the patient is discharged prior to next treatment session.     Plan  Times per week: 7  Times per day: Daily  Current Treatment Recommendations: ROM, Balance Training, Functional Mobility Training, Endurance Training, Self-Care / ADL                                             AM-PAC Score        AM-PAC Inpatient Daily Activity Raw Score: 16  AM-PAC Inpatient ADL T-Scale Score : 35.96  ADL Inpatient CMS 0-100% Score: 53.32  ADL Inpatient CMS G-Code Modifier : CK    Goals  Short term goals  Time Frame for Short term goals: discharge  Short term goal 1: pt to transfer to 3 in 1 commode with Sung of 2- goal met; upgrade to [address] [place] term goal 2: pt to stand for 3 minutes with Sung to increase ADLs- goal met 8/18  Short term goal 3: pt to participate in LE dressing assessment (MET 8/16); revised goal: socks w/ Mod A using AE prn- not met  Short term goal 4: pt to do simple grooming with set up -goal met 8/18  Short term goal 5: pt to verbalize anter THR prec with min cues (not met, ongoing)  Patient Goals   Patient goals : not stated       Therapy Time   Individual Concurrent Group Co-treatment   Time In 1056         Time Out 1129         Minutes 33              Timed Code Treatment Minutes:  33  Total Treatment Minutes:  [address] OTR/L #[id number]

## 2018-08-20 LAB
ANION GAP SERPL CALCULATED.3IONS-SCNC: 7 MMOL/L (ref 3–16)
BASOPHILS ABSOLUTE: 0.1 K/UL (ref 0–0.2)
BASOPHILS RELATIVE PERCENT: 0.6 %
BUN BLDV-MCNC: 7 MG/DL (ref 7–20)
C DIFFICILE TOXIN, EIA: NORMAL
CALCIUM SERPL-MCNC: 7.8 MG/DL (ref 8.3–10.6)
CHLORIDE BLD-SCNC: 100 MMOL/L (ref 99–110)
CO2: 25 MMOL/L (ref 21–32)
CREAT SERPL-MCNC: 0.5 MG/DL (ref 0.6–1.2)
EOSINOPHILS ABSOLUTE: 0.3 K/UL (ref 0–0.6)
EOSINOPHILS RELATIVE PERCENT: 2.9 %
GFR AFRICAN AMERICAN: >60
GFR NON-AFRICAN AMERICAN: >60
GLUCOSE BLD-MCNC: 106 MG/DL (ref 70–99)
HCT VFR BLD CALC: 20.9 % (ref 36–48)
HCT VFR BLD CALC: 21.1 % (ref 36–48)
HCT VFR BLD CALC: 22.8 % (ref 36–48)
HEMOGLOBIN: 7.2 G/DL (ref 12–16)
HEMOGLOBIN: 7.2 G/DL (ref 12–16)
HEMOGLOBIN: 7.7 G/DL (ref 12–16)
LYMPHOCYTES ABSOLUTE: 1 K/UL (ref 1–5.1)
LYMPHOCYTES RELATIVE PERCENT: 10.4 %
MCH RBC QN AUTO: 31.3 PG (ref 26–34)
MCHC RBC AUTO-ENTMCNC: 34.2 G/DL (ref 31–36)
MCV RBC AUTO: 91.5 FL (ref 80–100)
MONOCYTES ABSOLUTE: 1.5 K/UL (ref 0–1.3)
MONOCYTES RELATIVE PERCENT: 16.3 %
NEUTROPHILS ABSOLUTE: 6.6 K/UL (ref 1.7–7.7)
NEUTROPHILS RELATIVE PERCENT: 69.8 %
PDW BLD-RTO: 14.5 % (ref 12.4–15.4)
PLATELET # BLD: 204 K/UL (ref 135–450)
PMV BLD AUTO: 6.9 FL (ref 5–10.5)
POTASSIUM REFLEX MAGNESIUM: 3.7 MMOL/L (ref 3.5–5.1)
RBC # BLD: 2.31 M/UL (ref 4–5.2)
SODIUM BLD-SCNC: 132 MMOL/L (ref 136–145)
WBC # BLD: 9.4 K/UL (ref 4–11)

## 2018-08-20 PROCEDURE — 97530 THERAPEUTIC ACTIVITIES: CPT

## 2018-08-20 PROCEDURE — 85018 HEMOGLOBIN: CPT

## 2018-08-20 PROCEDURE — 2580000003 HC RX 258: Performed by: ORTHOPAEDIC SURGERY

## 2018-08-20 PROCEDURE — 6370000000 HC RX 637 (ALT 250 FOR IP): Performed by: INTERNAL MEDICINE

## 2018-08-20 PROCEDURE — 87324 CLOSTRIDIUM AG IA: CPT

## 2018-08-20 PROCEDURE — C9113 INJ PANTOPRAZOLE SODIUM, VIA: HCPCS | Performed by: INTERNAL MEDICINE

## 2018-08-20 PROCEDURE — 36415 COLL VENOUS BLD VENIPUNCTURE: CPT

## 2018-08-20 PROCEDURE — 85014 HEMATOCRIT: CPT

## 2018-08-20 PROCEDURE — 1200000000 HC SEMI PRIVATE

## 2018-08-20 PROCEDURE — 97116 GAIT TRAINING THERAPY: CPT

## 2018-08-20 PROCEDURE — 85025 COMPLETE CBC W/AUTO DIFF WBC: CPT

## 2018-08-20 PROCEDURE — 6360000002 HC RX W HCPCS: Performed by: INTERNAL MEDICINE

## 2018-08-20 PROCEDURE — 97535 SELF CARE MNGMENT TRAINING: CPT

## 2018-08-20 PROCEDURE — 87449 NOS EACH ORGANISM AG IA: CPT

## 2018-08-20 PROCEDURE — 6370000000 HC RX 637 (ALT 250 FOR IP): Performed by: ORTHOPAEDIC SURGERY

## 2018-08-20 PROCEDURE — 6360000002 HC RX W HCPCS: Performed by: ORTHOPAEDIC SURGERY

## 2018-08-20 PROCEDURE — 80048 BASIC METABOLIC PNL TOTAL CA: CPT

## 2018-08-20 RX ADMIN — FLUTICASONE PROPIONATE 2 SPRAY: 50 SPRAY, METERED NASAL at 08:12

## 2018-08-20 RX ADMIN — ENOXAPARIN SODIUM 40 MG: 40 INJECTION SUBCUTANEOUS at 08:12

## 2018-08-20 RX ADMIN — GABAPENTIN 1200 MG: 600 TABLET, FILM COATED ORAL at 20:13

## 2018-08-20 RX ADMIN — OXYCODONE HYDROCHLORIDE AND ACETAMINOPHEN 2 TABLET: 5; 325 TABLET ORAL at 14:46

## 2018-08-20 RX ADMIN — GABAPENTIN 1200 MG: 600 TABLET, FILM COATED ORAL at 08:12

## 2018-08-20 RX ADMIN — CARBAMAZEPINE 100 MG: 100 TABLET, EXTENDED RELEASE ORAL at 08:12

## 2018-08-20 RX ADMIN — LEVOTHYROXINE SODIUM 100 MCG: 100 TABLET ORAL at 06:41

## 2018-08-20 RX ADMIN — CETIRIZINE HYDROCHLORIDE 10 MG: 10 TABLET, FILM COATED ORAL at 08:12

## 2018-08-20 RX ADMIN — OXYCODONE HYDROCHLORIDE AND ACETAMINOPHEN 1 TABLET: 5; 325 TABLET ORAL at 20:14

## 2018-08-20 RX ADMIN — PANTOPRAZOLE SODIUM 40 MG: 40 INJECTION, POWDER, FOR SOLUTION INTRAVENOUS at 08:12

## 2018-08-20 RX ADMIN — Medication 10 ML: at 08:13

## 2018-08-20 RX ADMIN — DOCUSATE SODIUM 100 MG: 100 CAPSULE, LIQUID FILLED ORAL at 20:14

## 2018-08-20 RX ADMIN — GABAPENTIN 1200 MG: 600 TABLET, FILM COATED ORAL at 14:41

## 2018-08-20 RX ADMIN — ONDANSETRON HYDROCHLORIDE 4 MG: 2 INJECTION, SOLUTION INTRAMUSCULAR; INTRAVENOUS at 10:00

## 2018-08-20 RX ADMIN — CARBAMAZEPINE 100 MG: 100 TABLET, EXTENDED RELEASE ORAL at 20:14

## 2018-08-20 ASSESSMENT — PAIN DESCRIPTION - DESCRIPTORS: DESCRIPTORS: ACHING

## 2018-08-20 ASSESSMENT — PAIN DESCRIPTION - FREQUENCY: FREQUENCY: CONTINUOUS

## 2018-08-20 ASSESSMENT — PAIN SCALES - GENERAL
PAINLEVEL_OUTOF10: 4
PAINLEVEL_OUTOF10: 7
PAINLEVEL_OUTOF10: 0
PAINLEVEL_OUTOF10: 5

## 2018-08-20 ASSESSMENT — PAIN DESCRIPTION - ONSET: ONSET: ON-GOING

## 2018-08-20 ASSESSMENT — PAIN DESCRIPTION - LOCATION: LOCATION: HIP

## 2018-08-20 ASSESSMENT — PAIN DESCRIPTION - PROGRESSION: CLINICAL_PROGRESSION: NOT CHANGED

## 2018-08-20 ASSESSMENT — PAIN DESCRIPTION - ORIENTATION: ORIENTATION: LEFT

## 2018-08-20 ASSESSMENT — PAIN DESCRIPTION - PAIN TYPE: TYPE: SURGICAL PAIN

## 2018-08-20 NOTE — PROGRESS NOTES
Nutrition Assessment    Type and Reason for Visit: Initial    Malnutrition Assessment:  · Malnutrition Status: No malnutrition    Nutrition Diagnosis:   · Problem: No nutrition diagnosis at this time    Nutrition Assessment:  · Subjective Assessment: LOS assessment. Per MD notes, Left closed femur neck secondary to mechanical fall- S/P L- hip hemiarthroplasty. Patient reports a fair appetite. Noted 1/2 muffin eaten, oatmeal untouched from breakfast tray. 25-75% consumed last 3 meals documented. Encouraged adequate calorie and protein intake. Patient became tearful and states she is discouraged, wants to be discharged to be closer to family. Offered oral supplements, however patient declined further nutrition needs. Nutrition Risk Level   Risk Level: Low    Nutrition Intervention  Food and/or Delivery: Continue current diet  Nutrition Education/Counseling/Coordination of Care:  Continued Inpatient Monitoring    Patient assessed for nutrition risk. Deemed to be at low risk at this time. Will continue to follow patient.       Electronically signed by Adelita Haq RD, BEREKET on 8/20/18 at 12:27 PM    Contact Number: [phone]

## 2018-08-20 NOTE — PROGRESS NOTES
daily CBC's  - Continue AC per ortho recc's post op    Hyponatremia- Na 131- Stable   - Will stop IVF's  - Monitor    HTN- Stable     Hypothyroidism - Stable    Thrombocytopenia- Resolved    Leukocytosis- Resolved  - Monitor  - Check UA    Fever- Chest xray and UA  - No UTI or infiltrate on Cxray  - Fever if sign of infection obvious     DVT Prophylaxis: Lovenox (Per Ortho)  Diet: DIET GENERAL;  Code Status: Full Code    PT/OT Eval Status: 16/24 on the [employer] [facility] [address] - Once medically stable     Pamela Christianson MD

## 2018-08-20 NOTE — PROGRESS NOTES
Therapy Time   Individual Concurrent Group Co-treatment   Time In 1334         Time Out 1357         Minutes 23                 Timed Code Treatment Minutes:  23    Total Treatment Minutes:  23    If patient is discharged prior to next treatment, this note will serve as the discharge summary.   Grey Hernandez, PT, DPT  [id number]

## 2018-08-20 NOTE — PROGRESS NOTES
preoperatively. She does also have a history of thrombocytopenia which per IM has resolved. Will review this patient with Dr. Elsi Vega. Follow up with Dr. Elsi Vega in office in 10 days.        Duarte Charles PA-C  Physician Assistant, Orthopedic Surgery and [address] [place] [address]  8/20/2018  12:36 PM

## 2018-08-20 NOTE — PROGRESS NOTES
Denies    Orientation  Orientation  Overall Orientation Status: Within Functional Limits  Objective    ADL  Grooming: Setup;Modified independent  (assist to open ADL containers; hand hygiene, use mouth wash (sba stance at sink))  LE Dressing: Maximum assistance (able to doff R sock; but required assist to don; unable to reach L foot (would benefit from trial AE))  Toileting: Maximum assistance (pt able to complete anterior hygiene; assist posterior pericare and brief mgmt)        Balance  Sitting Balance: Supervision  Standing Balance: Stand by assistance (static stance)  Standing Balance  Time: 1 min, 5 min, 4 min  Activity: bathroom mobility, toileting, grooming, mobility in room  Sit to stand: Minimal assistance (min A from eob and chair; cga from chair x1 trial)  Stand to sit: Contact guard assistance (to chair x3)  Functional Mobility  Functional - Mobility Device: Rolling Walker  Activity: To/from bathroom; Other  Assist Level: Contact guard assistance  Functional Mobility Comments: following bathroom mobility, required seated rest prior to additional mobility 20' in room using RW, forward flexed due to chronic back pain    Toilet Transfers  Toilet - Technique: Ambulating (w/ RW)  Equipment Used: Standard toilet (bilateral grab bars)  Toilet Transfer: Minimal assistance    Bed mobility  Supine to Sit: Minimal assistance (assist L LE; HOB elevated, increased time/effort)  Transfers  Sit to stand: Minimal assistance (min A from eob and chair; cga from chair x1 trial)  Stand to sit: Contact guard assistance (to chair x3)           Cognition  Overall Cognitive Status: [facility]  Cognition Comment: pt able to recall 2 hip precautions- \"don't cross legs\" \"don't twist leg\"           Assessment   Performance deficits / Impairments: Decreased functional mobility ; Decreased ADL status; Decreased ROM  Assessment: pt demonstrates improving transfers and improving activity tolerance, requiring less frequent rest breaks during ADLs. Pt continues to benefit from skilled OT tx. Continue per POC  Treatment Diagnosis: impaired ADLs and transfers secondary to L hip fx, s/p THR  Prognosis: Good  Patient Education: ADLs, safe transfers, hip precautions- verb understanding  REQUIRES OT FOLLOW UP: Yes  Activity Tolerance  Activity Tolerance: Patient limited by fatigue;Patient Tolerated treatment well  Safety Devices  Safety Devices in place: Yes  Type of devices: Call light within reach;Nurse notified; Left in chair;Chair alarm in place          Plan   Plan  Times per week: 7  Times per day: Daily  Current Treatment Recommendations: ROM, Balance Training, Functional Mobility Training, Endurance Training, Self-Care / ADL  G-Code     OutComes Score                                           AM-PAC Score        AM-PAC Inpatient Daily Activity Raw Score: 17  AM-PAC Inpatient ADL T-Scale Score : 37.26  ADL Inpatient CMS 0-100% Score: 50.11  ADL Inpatient CMS G-Code Modifier : CK    Goals  Short term goals  Time Frame for Short term goals: discharge  Short term goal 1: pt to transfer to 3 in 1 commode with Sung of 2- goal met; upgrade to cga- not met  Short term goal 2: pt to stand for 3 minutes with Sung to increase ADLs- goal met 8/18  Short term goal 3: pt to participate in LE dressing assessment (MET 8/16); revised goal: socks w/ Mod A using AE prn- not met  Short term goal 4: pt to do simple grooming with set up -goal met 8/18  Short term goal 5: pt to verbalize anter THR prec with min cues- not met  Patient Goals   Patient goals : not stated       Therapy Time   Individual Concurrent Group Co-treatment   Time In 1015         Time Out 1057         Minutes 42         Timed Code Treatment Minutes:   42  Total Treatment Minutes:  43     If patient is d/c prior to next treatment session, this note will serve as the discharge summary    [employer] OTR/L, 0188

## 2018-08-20 NOTE — PROGRESS NOTES
H&H at 2349 was 7.2 and 20.9. Labs to be drawn Q12H. Dressing to hip remains c/d/i. Will continue to monitor.

## 2018-08-20 NOTE — PROGRESS NOTES
Hospitalist Progress Note      PCP: Isac Dupree MD    Date of Admission: 8/13/2018    Chief Complaint at admission: L-hip pain    Hospital Course/ Subjective:   78F with HTN, hypothyroidism admitted after a fall with L-closed femur neck secondary to mechanical fall. Orthopedic surgery was consulted and she underwent L- hip hemiarthroplasty with some blood loss. Patients Hgb went down from 9.1->7.2-> 6.4-> 5.8 after which she got transfused 2 units and a CT L-hip was obtained that showed small focal hematoma and postsurgical changes which are not concerning for collection/abscess per ortho. Hgb remains on low side but overall stable, 7.6->7.8->7.2 this am with some swelling and tenderness at surgical site. Ortho evaluated and recommends following Hgb 1 more day with no intervention planned. Patient with no chest pain, sob, chills but having low grade fevers.        Medications:  Reviewed    Infusion Medications     Scheduled Medications    [START ON 8/21/2018] enoxaparin  40 mg Subcutaneous Daily    pantoprazole  40 mg Intravenous Daily    furosemide  20 mg Intravenous See Admin Instructions    sodium chloride flush  10 mL Intravenous 2 times per day    docusate sodium  100 mg Oral BID    0.9 % sodium chloride  250 mL Intravenous Once    cetirizine  10 mg Oral Daily    fluticasone  2 spray Nasal Daily    sodium chloride flush  10 mL Intravenous 2 times per day    levothyroxine  100 mcg Oral Daily    gabapentin  1,200 mg Oral TID    carBAMazepine  100 mg Oral BID     PRN Meds: diphenhydrAMINE, acetaminophen, prochlorperazine, hydrALAZINE, sodium chloride flush, LORazepam, morphine, oxyCODONE-acetaminophen **OR** oxyCODONE-acetaminophen, sodium chloride flush, magnesium hydroxide, ondansetron, nicotine      Intake/Output Summary (Last 24 hours) at 08/20/18 1511  Last data filed at 08/20/18 1403   Gross per 24 hour   Intake              240 ml   Output                0 ml   Net 08/19/2018    GLUCOSEU Negative 08/19/2018       Radiology:  XR CHEST PORTABLE   Final Result   1. No acute findings in the chest.      VL Extremity Venous Left   Final Result      CT FEMUR LEFT W CONTRAST   Final Result   1. Postsurgical changes related to left femoral hemiarthroplasty placement, for treatment of a left femoral neck fracture. 2. There is generalized soft tissue edema and foci of soft tissue gas in the surgical bed which are presumably postoperative. No \"difficult to exclude an infectious process. 3. Small, focal hematoma along the left proximal anterior/lateral thigh. XR PELVIS (1-2 VIEWS)   Final Result   1. Status post left hip replacement without complication. XR PELVIS (1-2 VIEWS)   Final Result   Impression:    Displaced transcervical left femoral neck fracture. XR CHEST PORTABLE   Final Result      No acute pulmonary disease. XR KNEE LEFT (3 VIEWS)   Final Result   1. Displaced subcapital fracture of the left femoral neck. 2. No acute findings in the left knee. XR FEMUR LEFT (MIN 2 VIEWS)   Final Result   1. Displaced subcapital fracture of the left femoral neck. 2. No acute findings in the left knee. Assessment/Plan:    Active Hospital Problems    Diagnosis Date Noted    Closed fracture of neck of left femur ([facility] [address] [S72.002A] 08/13/2018    Hip pain, acute, left [M25.552] 08/13/2018    HTN (hypertension) [I10] 01/03/2011    Hypothyroidism [E03.9] 09/27/2008     Left closed femur neck secondary to mechanical fall- S/P L- hip hemiarthroplasty per orthopedics. No significant concern for infection or large hematoma per ortho but Hgbs still on low side   - Further management per Ortho  - PT/OT eval done  - Bowel regimen  - Pain control  - AC per orthopedics- On Lovenox    Acute blood loss anemia- 700cc of reported blood loss with 350cc re-transfused and a net 350cc loss during hemiarthroplasty.  Hgb went down from 9.1->7.2-> 6.4-> 5.8 for which she got transfused 2 units and a CT L-hip was obtained that showed small focal hematoma and postsurgical changes which are not concerning for collection/abscess per ortho. Hgb remains on low side but overall stable, 7.6->7.8->7.2 this am with some swelling and tenderness at surgical site. Ortho evaluated and recommends following Hgb 1 more day with no intervention planned.   - Will follow CBC tomorrow am and d/c patient if stable   - Continue AC per ortho recc's post op    Hyponatremia- Na 132- Improved   - Monitor    HTN- Stable     Hypothyroidism - Stable    Thrombocytopenia- Resolved    Leukocytosis- Resolved  - Monitor  - Check UA    Fever- Resolved   - No UTI or infiltrate on Cxray    DVT Prophylaxis: Lovenox (Per Ortho)  Diet: DIET GENERAL;  Code Status: Full Code    PT/OT Eval Status: 16/24 on the [address] [place] [address] - Once medically stable/ Ok with ortho    Irina Talbert MD

## 2018-08-20 NOTE — PROGRESS NOTES
Patient alert and oriented x4. Vitals stable. Pain controlled with oral medication. Tolerating ambulation well. Dressing to hip is c/d/i with ABD pads and medipore tape. H&H remains low but trending up. Labs scheduled Q12H. Will continue to monitor.

## 2018-08-21 VITALS
WEIGHT: 128 LBS | OXYGEN SATURATION: 95 % | HEIGHT: 62 IN | BODY MASS INDEX: 23.55 KG/M2 | RESPIRATION RATE: 16 BRPM | HEART RATE: 78 BPM | TEMPERATURE: 98.3 F | SYSTOLIC BLOOD PRESSURE: 126 MMHG | DIASTOLIC BLOOD PRESSURE: 74 MMHG

## 2018-08-21 LAB
ABO/RH: NORMAL
ANION GAP SERPL CALCULATED.3IONS-SCNC: 8 MMOL/L (ref 3–16)
ANTIBODY SCREEN: NORMAL
BASOPHILS ABSOLUTE: 0 K/UL (ref 0–0.2)
BASOPHILS RELATIVE PERCENT: 0.6 %
BUN BLDV-MCNC: 6 MG/DL (ref 7–20)
CALCIUM SERPL-MCNC: 7.6 MG/DL (ref 8.3–10.6)
CHLORIDE BLD-SCNC: 99 MMOL/L (ref 99–110)
CO2: 26 MMOL/L (ref 21–32)
CREAT SERPL-MCNC: 0.5 MG/DL (ref 0.6–1.2)
EOSINOPHILS ABSOLUTE: 0.5 K/UL (ref 0–0.6)
EOSINOPHILS RELATIVE PERCENT: 5.8 %
GFR AFRICAN AMERICAN: >60
GFR NON-AFRICAN AMERICAN: >60
GLUCOSE BLD-MCNC: 107 MG/DL (ref 70–99)
HCT VFR BLD CALC: 20.3 % (ref 36–48)
HCT VFR BLD CALC: 23.7 % (ref 36–48)
HEMOGLOBIN: 6.9 G/DL (ref 12–16)
HEMOGLOBIN: 7.7 G/DL (ref 12–16)
LYMPHOCYTES ABSOLUTE: 1 K/UL (ref 1–5.1)
LYMPHOCYTES RELATIVE PERCENT: 13 %
MAGNESIUM: 1.4 MG/DL (ref 1.8–2.4)
MCH RBC QN AUTO: 31.2 PG (ref 26–34)
MCHC RBC AUTO-ENTMCNC: 34.2 G/DL (ref 31–36)
MCV RBC AUTO: 91.3 FL (ref 80–100)
MONOCYTES ABSOLUTE: 1.2 K/UL (ref 0–1.3)
MONOCYTES RELATIVE PERCENT: 15.1 %
NEUTROPHILS ABSOLUTE: 5.2 K/UL (ref 1.7–7.7)
NEUTROPHILS RELATIVE PERCENT: 65.5 %
PATHOLOGIST REPORT, TRANSFUSION REACTION: NORMAL
PDW BLD-RTO: 14.9 % (ref 12.4–15.4)
PLATELET # BLD: 229 K/UL (ref 135–450)
PMV BLD AUTO: 6.9 FL (ref 5–10.5)
POTASSIUM REFLEX MAGNESIUM: 3.5 MMOL/L (ref 3.5–5.1)
RBC # BLD: 2.22 M/UL (ref 4–5.2)
SODIUM BLD-SCNC: 133 MMOL/L (ref 136–145)
WBC # BLD: 8 K/UL (ref 4–11)

## 2018-08-21 PROCEDURE — 2580000003 HC RX 258: Performed by: ORTHOPAEDIC SURGERY

## 2018-08-21 PROCEDURE — 97116 GAIT TRAINING THERAPY: CPT

## 2018-08-21 PROCEDURE — 86901 BLOOD TYPING SEROLOGIC RH(D): CPT

## 2018-08-21 PROCEDURE — 97530 THERAPEUTIC ACTIVITIES: CPT

## 2018-08-21 PROCEDURE — 86900 BLOOD TYPING SEROLOGIC ABO: CPT

## 2018-08-21 PROCEDURE — 97110 THERAPEUTIC EXERCISES: CPT

## 2018-08-21 PROCEDURE — 6360000002 HC RX W HCPCS: Performed by: INTERNAL MEDICINE

## 2018-08-21 PROCEDURE — 85018 HEMOGLOBIN: CPT

## 2018-08-21 PROCEDURE — 36415 COLL VENOUS BLD VENIPUNCTURE: CPT

## 2018-08-21 PROCEDURE — 6370000000 HC RX 637 (ALT 250 FOR IP): Performed by: INTERNAL MEDICINE

## 2018-08-21 PROCEDURE — 86850 RBC ANTIBODY SCREEN: CPT

## 2018-08-21 PROCEDURE — 80048 BASIC METABOLIC PNL TOTAL CA: CPT

## 2018-08-21 PROCEDURE — C9113 INJ PANTOPRAZOLE SODIUM, VIA: HCPCS | Performed by: INTERNAL MEDICINE

## 2018-08-21 PROCEDURE — 97535 SELF CARE MNGMENT TRAINING: CPT

## 2018-08-21 PROCEDURE — 83735 ASSAY OF MAGNESIUM: CPT

## 2018-08-21 PROCEDURE — 85025 COMPLETE CBC W/AUTO DIFF WBC: CPT

## 2018-08-21 PROCEDURE — 86923 COMPATIBILITY TEST ELECTRIC: CPT

## 2018-08-21 PROCEDURE — 6360000002 HC RX W HCPCS: Performed by: PHYSICIAN ASSISTANT

## 2018-08-21 PROCEDURE — 85014 HEMATOCRIT: CPT

## 2018-08-21 RX ORDER — FUROSEMIDE 10 MG/ML
20 INJECTION INTRAMUSCULAR; INTRAVENOUS ONCE
Status: COMPLETED | OUTPATIENT
Start: 2018-08-21 | End: 2018-08-21

## 2018-08-21 RX ORDER — 0.9 % SODIUM CHLORIDE 0.9 %
250 INTRAVENOUS SOLUTION INTRAVENOUS ONCE
Status: DISCONTINUED | OUTPATIENT
Start: 2018-08-21 | End: 2018-08-21

## 2018-08-21 RX ORDER — HYDROCHLOROTHIAZIDE 25 MG/1
12.5 TABLET ORAL DAILY
Qty: 30 TABLET | Refills: 1 | DISCHARGE
Start: 2018-08-21

## 2018-08-21 RX ORDER — MAGNESIUM SULFATE IN WATER 40 MG/ML
2 INJECTION, SOLUTION INTRAVENOUS ONCE
Status: COMPLETED | OUTPATIENT
Start: 2018-08-21 | End: 2018-08-21

## 2018-08-21 RX ORDER — POTASSIUM CHLORIDE 20 MEQ/1
40 TABLET, EXTENDED RELEASE ORAL ONCE
Status: COMPLETED | OUTPATIENT
Start: 2018-08-21 | End: 2018-08-21

## 2018-08-21 RX ADMIN — OXYCODONE HYDROCHLORIDE AND ACETAMINOPHEN 2 TABLET: 5; 325 TABLET ORAL at 09:56

## 2018-08-21 RX ADMIN — CARBAMAZEPINE 100 MG: 100 TABLET, EXTENDED RELEASE ORAL at 09:56

## 2018-08-21 RX ADMIN — Medication 10 ML: at 09:58

## 2018-08-21 RX ADMIN — GABAPENTIN 1200 MG: 600 TABLET, FILM COATED ORAL at 13:00

## 2018-08-21 RX ADMIN — OXYCODONE HYDROCHLORIDE AND ACETAMINOPHEN 1 TABLET: 5; 325 TABLET ORAL at 03:56

## 2018-08-21 RX ADMIN — POTASSIUM CHLORIDE 40 MEQ: 20 TABLET, EXTENDED RELEASE ORAL at 16:12

## 2018-08-21 RX ADMIN — ENOXAPARIN SODIUM 40 MG: 40 INJECTION SUBCUTANEOUS at 09:57

## 2018-08-21 RX ADMIN — CETIRIZINE HYDROCHLORIDE 10 MG: 10 TABLET, FILM COATED ORAL at 09:57

## 2018-08-21 RX ADMIN — LEVOTHYROXINE SODIUM 100 MCG: 100 TABLET ORAL at 07:10

## 2018-08-21 RX ADMIN — FLUTICASONE PROPIONATE 2 SPRAY: 50 SPRAY, METERED NASAL at 09:55

## 2018-08-21 RX ADMIN — FUROSEMIDE 20 MG: 10 INJECTION, SOLUTION INTRAMUSCULAR; INTRAVENOUS at 16:13

## 2018-08-21 RX ADMIN — PANTOPRAZOLE SODIUM 40 MG: 40 INJECTION, POWDER, FOR SOLUTION INTRAVENOUS at 09:56

## 2018-08-21 RX ADMIN — GABAPENTIN 1200 MG: 600 TABLET, FILM COATED ORAL at 09:56

## 2018-08-21 RX ADMIN — MAGNESIUM SULFATE IN WATER 2 G: 40 INJECTION, SOLUTION INTRAVENOUS at 12:53

## 2018-08-21 ASSESSMENT — PAIN SCALES - GENERAL
PAINLEVEL_OUTOF10: 2
PAINLEVEL_OUTOF10: 7
PAINLEVEL_OUTOF10: 4

## 2018-08-21 NOTE — PLAN OF CARE
Problem: Falls - Risk of:  Goal: Absence of physical injury  Absence of physical injury   Outcome: Met This Shift  Pt free from injury this shift and free of falls. 2/4 rails up on bed and bed is in the lowest position. Wheels locked and bed alarm set. Socks on pt and ID bands on pt. Call light in reach of pt and pt educated to call out to get up x1 with walker and gait belt. Will continue to monitor for safety.

## 2018-08-21 NOTE — CARE COORDINATION
Scheduled transport to [facility] at 1800 today. If patient is not ready to go, cancel transport.   [facility] transport [phone]  Electronically signed by RUBÉN Lee, BRIANW on 8/21/2018 at 9:49 AM

## 2018-08-21 NOTE — CARE COORDINATION
Call report to :  [phone] ask for Nursing M[facility] dept.     SW/CM faxed DORIAN/AVS.  Electronically signed by RUBÉN Ovalle, GROVER on 8/21/2018 at 2:43 PM

## 2018-08-21 NOTE — PROGRESS NOTES
Physical Therapy  Daily Treatment Note    Discharge Recommendations: Jamal Beckwith scored a 17/24 on the AM-PAC short mobility form. Current research shows that an AM-PAC score of 17 or less is typically not associated with a discharge to the patient's home setting. Based on the patients AM-PAC score and their current functional mobility deficits, it is recommended that the patient have 3-5 sessions per week of Physical Therapy at d/c to increase the patients independence. Equipment Needs: Defer     Chart Reviewed: Yes     Other position/activity restrictions: Up with assist; Full WB; Anterior Hip precautions: No hip adduction beyond neutral, no external rotation and no hyperextension; contact plus precautions   Additional Pertinent Hx: Pt admitted 8/13 s/p fall while walking her dog, resulting in (+) Displaced transcervical left femoral neck fracture. Pt to OR 8/14 s/p left hip hemiarthroplasty. PMH:  OA, DDD, HTN, trigeminal nerve diease, back surgery, L knee TKR    Diagnosis: L femoral neck fx   Treatment Diagnosis: Decreased gait associated with L hip fx. Subjective: Pt in bed initially. Ready to work with PT. Hopes to be D/Jone today. Feeling pretty good. Pain: Sore L hip. RN aware and addressing. Ice packs to L hip after therapy. Objective:    Bed mobility  Supine to sit: CGA, HOB up partially  Sit to Supine: Mod assist for LEs    Transfers  Sit to stand: CGA from bed (twice) Min assist from commode  Stand to sit: CGA. Cues for L LE placement initially. Ambulation  Assistance Level: CGA  Assistive device: Wheeled walker  Distance: 10 ft, 4 ft, 50 ft, 10 ft. Seated rests between walks. Quality of gait: Slow, but steady. Occasional cues for sequencing. Mild limp L LE. Significantly flexed posture (pt reports chronic back problems.)    Exercises  10 reps B ankle pumps, quad sets, glut sets, L hip abd/add (mod assist), SAQ, heel slides in supine.     Balance  Sat EOB with SBA  Sat on commode with SBA  Static stance with walker CGA  Ambulation with wheeled walker CGA    Patient Education  Calling for assist with needs. Expressed understanding. Safety Devices  Pt left with needs in reach. In chair with chair alarm on. RN updated. Assessment:  Pt with good effort and participation. Following hip precautions with mobility. Still having difficulty with bed mobility and occasionally with transfers. Limited endurance at this time. Would benefit from skilled PT at D/C to maximize independence. AM-PAC score  AM-PAC Inpatient Mobility Raw Score : 17  AM-PAC Inpatient T-Scale Score : 42.13  Mobility Inpatient CMS 0-100% Score: 50.57  Mobility Inpatient CMS G-Code Modifier : CK    Goals: (as determined and assessed by primary PT)  Time Frame for Short term goals: Discharge  Short term goal 1: bed mobility mod A MET 8/19 UPDATE supine<>sit with CGA  ongoing   Short term goal 2: sit <> stand mod A  MET 8/19 UPDATE sit<>Stand with CGA  ongoing   Short term goal 3: ambulate 25ft with rolling walker mod A  MET 8/19 UPDATE ambulate 100' with LRAD and CGA  ongoing      Plan:  Times per week: 7; Times per day: Daily  Current Treatment Recommendations: Transfer Training, Gait Training, Strengthening    Therapy Time    Individual  Concurrent  Group  Co-treatment    Time In  850            Time Out  932            Minutes  42              Timed Code Treatment Minutes: 42  Total Treatment Minutes: 42    Will continue per plan of care. If patient is discharged prior to next treatment, this note will serve as the discharge summary. Patricia Waterman #[id number]    Pt met goal #2 this date. Continue working towards remaining unmet goals.   Danelle Palma, PT, DPT  [id number]

## 2018-08-21 NOTE — PROGRESS NOTES
VSS. Dressing CDI. Pain controlled with PO. Ice on hip. SCDs on. Skin intact. lungs clear. 1000 on IS. Pt up x1 with walker and tolerates ambulating well. Pt ambulated 50 feet around to door and back to bed overnight. Bed alarm set. Call light in reach. Will continue to monitor. Addendum:  0400:Dressing changed with (2)4X8, (1) ABD pad and medipore this AM d/t moderate leakage. Serosanguinous drainage noted to old dressing. Pt reports that the surgical site has had much less burning/pain overnight and rates her pain a 4/10 after ambulation.

## 2018-08-22 ENCOUNTER — TELEPHONE (OUTPATIENT)
Dept: ORTHOPEDIC SURGERY | Age: 78
End: 2018-08-22

## 2018-08-22 ENCOUNTER — CARE COORDINATION (OUTPATIENT)
Dept: CASE MANAGEMENT | Age: 78
End: 2018-08-22

## 2018-08-22 LAB — CULTURE TRANSFUSION REACTION: NORMAL

## 2018-08-22 NOTE — TELEPHONE ENCOUNTER
Spoke with the nurse caring for [facility].  Instructions per Inola Dec the patient should take aspirin 325mg BID 30 days

## 2018-08-22 NOTE — TELEPHONE ENCOUNTER
Internal medicine had the patient on Lovenox. In Dr. Brianna Mcclain' s note prior to d/c internal medicine was to switch the patient to oral anticoagulation.

## 2018-08-23 NOTE — DISCHARGE SUMMARY
Exam:    /74   Pulse 78   Temp 98.3 °F (36.8 °C) (Oral)   Resp 16   Ht 5' 2.01\" (1.575 m)   Wt 128 lb (58.1 kg)   SpO2 95%   BMI 23.41 kg/m²      General appearance: No apparent distress, appears stated age and cooperative. HEENT: Pupils equal, round, and reactive to light. Conjunctivae/corneas clear. Neck: Supple, with full range of motion. Respiratory:  Normal respiratory effort. Clear to auscultation, bilaterally   Cardiovascular: Regular rate and rhythm with normal S1/S2 with BOOM  Abdomen: Soft, non-tender, non-distended with normal bowel sounds. Musculoskeletal: Left thigh mild swelling at surgical dressing site with dressing c/d/i  Neurologic:  Neurovascularly intact without any focal sensory/motor deficits. Psychiatric: Alert and oriented, thought content appropriate, normal insight  Capillary Refill: Brisk,< 3 seconds   Peripheral Pulses: +2 palpable, equal bilaterally     Labs: For convenience and continuity at follow-up the following most recent labs are provided:    Lab Results   Component Value Date    WBC 8.0 08/21/2018    HGB 7.7 08/21/2018    HCT 23.7 08/21/2018    MCV 91.3 08/21/2018     08/21/2018     08/21/2018    K 3.5 08/21/2018    CL 99 08/21/2018    CO2 26 08/21/2018    BUN 6 08/21/2018    CREATININE 0.5 08/21/2018    CALCIUM 7.6 08/21/2018    PHOS 2.6 08/16/2018    LABALBU 2.6 08/16/2018     Lab Results   Component Value Date    INR 0.96 08/13/2018    INR 1.02 12/28/2010       Radiology:  Xr Pelvis (1-2 Views)    Result Date: 8/14/2018  History: Left hip fracture. Status post hip surgery. AP view the pelvis. COMPARISON: 8/13/2018. FINDINGS: Total left hip replacement. Alignment appears anatomic. No fracture. No evidence of acute complication. 1. Status post left hip replacement without complication. Xr Pelvis (1-2 Views)    Result Date: 8/13/2018  Exam: XR PELVIS (1-2 VIEWS) History: fall, pain Comparison: None available Findings:  There is a displaced !Diagnosis! Date! Comments                                                    ! +---------+----+------------------------------------------------------------+ ! Other    ! !H/O hypothyroidism, spinal stenosis, trigeminal nerve       ! !         !    !disease, OA                                                 ! +---------+----+------------------------------------------------------------+   - The patient's risk factor(s) include: arterial hypertension. Velocities are measured in cm/s ; Diameters are measured in mm Right Lower Extremities DVT Study Measurements Right 2D Measurements +--------------+----------+---------------+----------+ ! Location      ! Visualized! Compressibility! Thrombosis! +--------------+----------+---------------+----------+ ! Common Femoral!Yes       ! Yes            ! None      ! +--------------+----------+---------------+----------+ Right Doppler Measurements +--------------+---------+------+------------+ ! Location      ! Signal   !Reflux! Reflux (sec)! +--------------+---------+------+------------+ ! Common Femoral!Pulsatile! No    !            ! +--------------+---------+------+------------+ Left Lower Extremities DVT Study Measurements Left 2D Measurements +------------------------+----------+---------------+----------+ ! Location                ! Visualized! Compressibility! Thrombosis! +------------------------+----------+---------------+----------+ ! Sapheno Femoral Junction! Yes       ! Yes            ! None      ! +------------------------+----------+---------------+----------+ ! GSV Thigh               ! Yes       ! Yes            ! None      ! +------------------------+----------+---------------+----------+ ! Common Femoral          !Yes       ! Yes            ! None      ! +------------------------+----------+---------------+----------+ ! Femoral                 !Yes       ! Yes            ! None      ! +------------------------+----------+---------------+----------+ ! Prox Femoral            !Yes       ! Yes

## 2018-08-25 LAB
BLOOD BANK DISPENSE STATUS: NORMAL
BLOOD BANK PRODUCT CODE: NORMAL
BPU ID: NORMAL
DESCRIPTION BLOOD BANK: NORMAL

## 2018-08-29 ENCOUNTER — CARE COORDINATION (OUTPATIENT)
Dept: CASE MANAGEMENT | Age: 78
End: 2018-08-29

## 2018-08-31 ENCOUNTER — OFFICE VISIT (OUTPATIENT)
Dept: ORTHOPEDIC SURGERY | Age: 78
End: 2018-08-31

## 2018-08-31 VITALS
SYSTOLIC BLOOD PRESSURE: 144 MMHG | HEART RATE: 63 BPM | WEIGHT: 128 LBS | BODY MASS INDEX: 22.68 KG/M2 | DIASTOLIC BLOOD PRESSURE: 69 MMHG | HEIGHT: 63 IN

## 2018-08-31 DIAGNOSIS — M25.552 LEFT HIP PAIN: Primary | ICD-10-CM

## 2018-08-31 DIAGNOSIS — Z98.890 STATUS POST HIP SURGERY: ICD-10-CM

## 2018-08-31 PROCEDURE — 99024 POSTOP FOLLOW-UP VISIT: CPT | Performed by: ORTHOPAEDIC SURGERY

## 2018-08-31 NOTE — PROGRESS NOTES
Hip surgery Follow-up  Aleyda Otero is here for follow up after left hip surgery. Surgery date was approximately 2 weeks ago. Findings at surgery: Displaced femoral neck fracture that was treated with a cemented hip hemiarthroplasty. Postoperative, patient did have some issues with bleeding and was transfused. . The patient is not having any pain. The patient's pain is rated at 1/10. The patient denies fever, wound drainage, increasing redness, pus, increasing swelling. Post op problems reported: none. She has been ambulating with a walker. She denies any chest pain, shortness of breath, lightheadedness or any other constitutional symptoms. Recent hemoglobin testing demonstrated that her hemoglobin was 8.8. She has been taking aspirin DVT prophylaxis. Physical Examination:    Patient is awake, alert, and in no acute distress. The wound is healing. There is no warmth, erythema, or purulent drainage over the incision. No pain with circumduction of the hip    Motor intact L4-S1    Sensation intact L2-S1    No calf tenderness    Lower extremity warm and well perfused    Xrays:   2 views of the left hip were taken in the office today. These xrays demonstrate left hip hemiarthroplasty prosthesis is in excellent position. There is no propagation of any fractures. There no fracture lines to suggest periprosthetic fracture. There is no loosening. Leg lengths are excellent. Assessment:   11 days status post left hip cemented hemiarthroplasty for femoral neck fracture, doing well. No chest pain or shortness of breath. Hemoglobin most recently was 8.8      Plan: We reviewed intra-operative findings. Continue physical therapy. Previously a physical therapy order was placed and postoperative physical therapy protocol was provided to the patient to give to the physical therapist.    Patient can be weightbearing as tolerated.     I will see her back next week for staple removal    Continue

## 2018-09-07 DIAGNOSIS — Z98.890 STATUS POST HIP SURGERY: Primary | ICD-10-CM

## 2018-09-10 ENCOUNTER — CARE COORDINATION (OUTPATIENT)
Dept: CASE MANAGEMENT | Age: 78
End: 2018-09-10

## 2018-09-10 NOTE — CARE COORDINATION
Spoke with Felix Whatley      Incision status: clear per patient staples were removed last Friday. Edema/Swelling none    Pain level and status: pt says she has no pain    Use of pain medications: no    Bowels: moving fine    Home Care Agency active: yes . Interim home care. Pt said the therapist is supposed to be coming today     Outpatient therapy: no    Do you have all of your medications: yes    Changes in medications: no    Follow up appointments:    No future appointments. Pt said she is using a front wheeled walker and is doing well at home, has family that lives with her as well. Pt said she has everything she needs today.      Anne-Marie Morrison RN, BSN, [facility]   Care Transition Coordinator  [phone]

## 2018-09-18 ENCOUNTER — CARE COORDINATION (OUTPATIENT)
Dept: CASE MANAGEMENT | Age: 78
End: 2018-09-18

## 2018-09-25 ENCOUNTER — CARE COORDINATION (OUTPATIENT)
Dept: CASE MANAGEMENT | Age: 78
End: 2018-09-25

## 2018-09-25 NOTE — CARE COORDINATION
[address] Transitions Interview     2018    Patient: Han Berumen Patient : 1940   MRN: [id number]  Reason for Admission: There are no discharge diagnoses documented for the most recent discharge. RARS: Readmission Risk Score: 16       Spoke with: Patient      Readmission Risk  Patient Active Problem List   Diagnosis    HTN (hypertension)    GERD (gastroesophageal reflux disease)    Hypothyroid    Spinal stenosis    Lumbar stenosis    Hypothyroidism    Closed fracture of neck of left femur (HCC)    Hip pain, acute, left       Inpatient Assessment  Care Transitions Summary    Care Transitions Inpatient Review  Medication Review  Housing Review  Social Support  Durable Medical Equipment  Functional Review  Hearing and Vision  Care Transitions Interventions         Follow Up Patient states that she has no pain and has not taken any Rx since she left the hospital. Patient states that PT told her today she can use just a cane and no longer needs a walker. She states that PT said they will probably D/C her on Friday. Incision is clean dry and without swelling. States that her appetite is off a little but she eats nutritious foods like a peanut butter sandwich. She does not like boost or ensure. No future appointments.     Health Maintenance  Health Maintenance Due   Topic Date Due    DEXA (modify frequency per FRAX score)  2006       Elidia Toro RN

## 2018-10-04 ENCOUNTER — CARE COORDINATION (OUTPATIENT)
Dept: CASE MANAGEMENT | Age: 78
End: 2018-10-04

## 2018-10-15 ENCOUNTER — CARE COORDINATION (OUTPATIENT)
Dept: CASE MANAGEMENT | Age: 78
End: 2018-10-15

## 2018-10-22 ENCOUNTER — CARE COORDINATION (OUTPATIENT)
Dept: CASE MANAGEMENT | Age: 78
End: 2018-10-22

## 2018-10-22 NOTE — CARE COORDINATION
Called and spoke with patients daughter who said Montserrat Felipe is doing well. No pain or issues at all.  Will continue to follow    Fernando Pinedo RN, BSN, [facility]   Care Transition Coordinator  [phone]

## 2018-11-16 ENCOUNTER — CARE COORDINATION (OUTPATIENT)
Dept: CASE MANAGEMENT | Age: 78
End: 2018-11-16

## (undated) DEVICE — SURE SET-DOUBLE BASIN-LF: Brand: MEDLINE INDUSTRIES, INC.

## (undated) DEVICE — 3M™ COBAN™ NL STERILE NON-LATEX SELF-ADHERENT WRAP, 2086S, 6 IN X 5 YD (15 CM X 4,5 M), 12 ROLLS/CASE: Brand: 3M™ COBAN™

## (undated) DEVICE — SUTURE ETHBND EXCEL SZ 2 L30IN NONABSORBABLE GRN L40MM V-37 MX69G

## (undated) DEVICE — CANNULA NSL AD TBNG L7FT PVC STR NONFLARED PRNG O2 DEL W STD

## (undated) DEVICE — HIP PILLOW, ABDUCTION: Brand: DEROYAL

## (undated) DEVICE — SUTURE VCRL SZ 2-0 L18IN ABSRB UD CT-1 L36MM 1/2 CIR J839D

## (undated) DEVICE — GLOVE SURG SZ 75 L12IN FNGR THK13MIL BRN LTX SYN POLYMER W

## (undated) DEVICE — HOLDER SCALP PLAS G STD

## (undated) DEVICE — TUBING SUCT L12FT DIA025IN UNIV W SCALLOPED FEM CONN

## (undated) DEVICE — NO USE 18 MONTHS PACKS ORTHO TOTAL 120198A

## (undated) DEVICE — BLADE SAW RECIP HVY DUTY LNG 0277096327] STRYKER CORP]

## (undated) DEVICE — T4 HOOD

## (undated) DEVICE — COVER LT HNDL BLU PLAS

## (undated) DEVICE — SOLUTION IV 1000ML 0.9% SOD CHL

## (undated) DEVICE — KIT PROC 2 SPRY APPL 11:1

## (undated) DEVICE — 2108 SERIES SAGITTAL BLADE (19.5 X 1.27 X 90.0MM)

## (undated) DEVICE — BLADE SAW RECIP HVY DUTY LNG 27796327] STRYKER CORP]

## (undated) DEVICE — TIP APPL TOP 2 SPRY

## (undated) DEVICE — SST TWIST DRILL, STANDARD, 2.4MM DIA. X 127MM: Brand: MICROAIRE®

## (undated) DEVICE — Z INACTIVE USE 2582933 BAG BLD TRNSF 1 CPLR IV ST 600ML TERUFLEX

## (undated) DEVICE — BLOOD TRANSFUSION FILTER: Brand: HAEMONETICS

## (undated) DEVICE — TURNOVER KIT RM INF CTRL TECH

## (undated) DEVICE — APPLICATOR PREP 26ML 0.7% IOD POVACRYLEX 74% ISO ALC ST

## (undated) DEVICE — HANDPIECE SUCTION TUBING INTERPULSE 10FT

## (undated) DEVICE — COAXIAL HIGH FLOW TIP WITH SOFT SHIELD

## (undated) DEVICE — NO USE 18 MONTHS PACKS BASIC 120194C

## (undated) DEVICE — ELECTRODE PT RET AD L9FT HI MOIST COND ADH HYDRGEL CORDED

## (undated) DEVICE — 3M™ STERI-DRAPE™ U-DRAPE 1015: Brand: STERI-DRAPE™

## (undated) DEVICE — BOWL AND CEMENT CARTRIDGE WITH BREAKAWAY FEMORAL NOZZLE AND MEDIUM PRESSURIZER: Brand: ACM

## (undated) DEVICE — Z INACTIVE NO SUPPLIER SOLUTIONIRRIG 3000ML 0.9% SOD CHL FLX CONT [79720808] [HOSPIRA WORLDWIDE INC]

## (undated) DEVICE — SUTURE VCRL SZ 0 L18IN ABSRB UD L36MM CT-1 1/2 CIR J840D

## (undated) DEVICE — PROTECTOR UNIV FOAM EXTREMITY DEVON

## (undated) DEVICE — GARMENT,MEDLINE,DVT,INT,CALF,MED, GEN2: Brand: MEDLINE

## (undated) DEVICE — SURGICAL PROCEDURE PACK PROC SMARTPREP 2

## (undated) DEVICE — STAPLER SKIN H3.9MM WIRE DIA0.58MM CRWN 6.9MM 35 STPL ROT

## (undated) DEVICE — DRESSING W4XL8IN ISLANDTHERABOND 3D

## (undated) DEVICE — SET PROC STD VOL BOWL SUCT ASMBLY GS FLTR BLD COLLCTN RESVR